# Patient Record
Sex: FEMALE | Race: WHITE | NOT HISPANIC OR LATINO | Employment: FULL TIME | URBAN - METROPOLITAN AREA
[De-identification: names, ages, dates, MRNs, and addresses within clinical notes are randomized per-mention and may not be internally consistent; named-entity substitution may affect disease eponyms.]

---

## 2017-09-06 ENCOUNTER — ALLSCRIPTS OFFICE VISIT (OUTPATIENT)
Dept: OTHER | Facility: OTHER | Age: 42
End: 2017-09-06

## 2017-09-06 DIAGNOSIS — E04.0 NONTOXIC DIFFUSE GOITER: ICD-10-CM

## 2017-09-06 DIAGNOSIS — R00.0 TACHYCARDIA: ICD-10-CM

## 2017-09-06 DIAGNOSIS — Z12.31 ENCOUNTER FOR SCREENING MAMMOGRAM FOR MALIGNANT NEOPLASM OF BREAST: ICD-10-CM

## 2017-09-06 LAB
BILIRUB UR QL STRIP: NEGATIVE
CLARITY UR: NORMAL
COLOR UR: NORMAL
GLUCOSE (HISTORICAL): NEGATIVE
HGB UR QL STRIP.AUTO: NEGATIVE
KETONES UR STRIP-MCNC: NEGATIVE MG/DL
LEUKOCYTE ESTERASE UR QL STRIP: NEGATIVE
NITRITE UR QL STRIP: NEGATIVE
PH UR STRIP.AUTO: 8 [PH]
PROT UR STRIP-MCNC: NORMAL MG/DL
SP GR UR STRIP.AUTO: 1.01
UROBILINOGEN UR QL STRIP.AUTO: NEGATIVE

## 2017-09-07 ENCOUNTER — GENERIC CONVERSION - ENCOUNTER (OUTPATIENT)
Dept: OTHER | Facility: OTHER | Age: 42
End: 2017-09-07

## 2017-09-09 ENCOUNTER — TRANSCRIBE ORDERS (OUTPATIENT)
Dept: ADMINISTRATIVE | Facility: HOSPITAL | Age: 42
End: 2017-09-09

## 2017-09-09 ENCOUNTER — APPOINTMENT (OUTPATIENT)
Dept: LAB | Facility: MEDICAL CENTER | Age: 42
End: 2017-09-09
Payer: COMMERCIAL

## 2017-09-09 DIAGNOSIS — E04.0 GOITER, SPECIFIED AS SIMPLE: ICD-10-CM

## 2017-09-09 DIAGNOSIS — R00.0 TACHYCARDIA, UNSPECIFIED: ICD-10-CM

## 2017-09-09 DIAGNOSIS — E04.0 GOITER, SPECIFIED AS SIMPLE: Primary | ICD-10-CM

## 2017-09-09 LAB
ALBUMIN SERPL BCP-MCNC: 3.9 G/DL (ref 3.5–5)
ALP SERPL-CCNC: 78 U/L (ref 46–116)
ALT SERPL W P-5'-P-CCNC: 26 U/L (ref 12–78)
ANION GAP SERPL CALCULATED.3IONS-SCNC: 7 MMOL/L (ref 4–13)
AST SERPL W P-5'-P-CCNC: 16 U/L (ref 5–45)
BASOPHILS # BLD AUTO: 0.02 THOUSANDS/ΜL (ref 0–0.1)
BASOPHILS NFR BLD AUTO: 0 % (ref 0–1)
BILIRUB SERPL-MCNC: 0.36 MG/DL (ref 0.2–1)
BUN SERPL-MCNC: 12 MG/DL (ref 5–25)
CALCIUM SERPL-MCNC: 9.3 MG/DL (ref 8.3–10.1)
CHLORIDE SERPL-SCNC: 110 MMOL/L (ref 100–108)
CHOLEST SERPL-MCNC: 190 MG/DL (ref 50–200)
CO2 SERPL-SCNC: 23 MMOL/L (ref 21–32)
CREAT SERPL-MCNC: 0.69 MG/DL (ref 0.6–1.3)
EOSINOPHIL # BLD AUTO: 0.07 THOUSAND/ΜL (ref 0–0.61)
EOSINOPHIL NFR BLD AUTO: 1 % (ref 0–6)
ERYTHROCYTE [DISTWIDTH] IN BLOOD BY AUTOMATED COUNT: 12.8 % (ref 11.6–15.1)
GFR SERPL CREATININE-BSD FRML MDRD: 108 ML/MIN/1.73SQ M
GLUCOSE P FAST SERPL-MCNC: 77 MG/DL (ref 65–99)
HCT VFR BLD AUTO: 41.6 % (ref 34.8–46.1)
HDLC SERPL-MCNC: 58 MG/DL (ref 40–60)
HGB BLD-MCNC: 14.3 G/DL (ref 11.5–15.4)
LDLC SERPL CALC-MCNC: 110 MG/DL (ref 0–100)
LYMPHOCYTES # BLD AUTO: 2.11 THOUSANDS/ΜL (ref 0.6–4.47)
LYMPHOCYTES NFR BLD AUTO: 33 % (ref 14–44)
MCH RBC QN AUTO: 31 PG (ref 26.8–34.3)
MCHC RBC AUTO-ENTMCNC: 34.4 G/DL (ref 31.4–37.4)
MCV RBC AUTO: 90 FL (ref 82–98)
MONOCYTES # BLD AUTO: 0.31 THOUSAND/ΜL (ref 0.17–1.22)
MONOCYTES NFR BLD AUTO: 5 % (ref 4–12)
NEUTROPHILS # BLD AUTO: 3.96 THOUSANDS/ΜL (ref 1.85–7.62)
NEUTS SEG NFR BLD AUTO: 61 % (ref 43–75)
NRBC BLD AUTO-RTO: 0 /100 WBCS
PLATELET # BLD AUTO: 269 THOUSANDS/UL (ref 149–390)
PMV BLD AUTO: 10.3 FL (ref 8.9–12.7)
POTASSIUM SERPL-SCNC: 4.3 MMOL/L (ref 3.5–5.3)
PROT SERPL-MCNC: 7.6 G/DL (ref 6.4–8.2)
RBC # BLD AUTO: 4.61 MILLION/UL (ref 3.81–5.12)
SODIUM SERPL-SCNC: 140 MMOL/L (ref 136–145)
T4 FREE SERPL-MCNC: 0.9 NG/DL (ref 0.76–1.46)
TRIGL SERPL-MCNC: 109 MG/DL
TSH SERPL DL<=0.05 MIU/L-ACNC: 0.64 UIU/ML (ref 0.36–3.74)
WBC # BLD AUTO: 6.49 THOUSAND/UL (ref 4.31–10.16)

## 2017-09-09 PROCEDURE — 80053 COMPREHEN METABOLIC PANEL: CPT

## 2017-09-09 PROCEDURE — 36415 COLL VENOUS BLD VENIPUNCTURE: CPT

## 2017-09-09 PROCEDURE — 84439 ASSAY OF FREE THYROXINE: CPT

## 2017-09-09 PROCEDURE — 80061 LIPID PANEL: CPT

## 2017-09-09 PROCEDURE — 84443 ASSAY THYROID STIM HORMONE: CPT

## 2017-09-09 PROCEDURE — 85025 COMPLETE CBC W/AUTO DIFF WBC: CPT

## 2017-09-11 ENCOUNTER — GENERIC CONVERSION - ENCOUNTER (OUTPATIENT)
Dept: OTHER | Facility: OTHER | Age: 42
End: 2017-09-11

## 2017-09-13 ENCOUNTER — HOSPITAL ENCOUNTER (OUTPATIENT)
Dept: NON INVASIVE DIAGNOSTICS | Facility: CLINIC | Age: 42
Discharge: HOME | End: 2017-09-13
Payer: COMMERCIAL

## 2017-09-13 DIAGNOSIS — R00.0 TACHYCARDIA: ICD-10-CM

## 2017-09-13 PROCEDURE — 93226 XTRNL ECG REC<48 HR SCAN A/R: CPT

## 2017-09-13 PROCEDURE — 93225 XTRNL ECG REC<48 HRS REC: CPT

## 2017-09-18 ENCOUNTER — GENERIC CONVERSION - ENCOUNTER (OUTPATIENT)
Dept: OTHER | Facility: OTHER | Age: 42
End: 2017-09-18

## 2017-09-26 ENCOUNTER — HOSPITAL ENCOUNTER (OUTPATIENT)
Dept: RADIOLOGY | Facility: MEDICAL CENTER | Age: 42
Discharge: HOME | End: 2017-09-26
Payer: COMMERCIAL

## 2017-09-26 DIAGNOSIS — E04.0 NONTOXIC DIFFUSE GOITER: ICD-10-CM

## 2017-09-26 DIAGNOSIS — Z12.31 ENCOUNTER FOR SCREENING MAMMOGRAM FOR MALIGNANT NEOPLASM OF BREAST: ICD-10-CM

## 2017-09-26 PROCEDURE — 76536 US EXAM OF HEAD AND NECK: CPT

## 2017-09-26 PROCEDURE — G0202 SCR MAMMO BI INCL CAD: HCPCS

## 2017-09-27 ENCOUNTER — GENERIC CONVERSION - ENCOUNTER (OUTPATIENT)
Dept: OTHER | Facility: OTHER | Age: 42
End: 2017-09-27

## 2017-10-03 ENCOUNTER — GENERIC CONVERSION - ENCOUNTER (OUTPATIENT)
Dept: OTHER | Facility: OTHER | Age: 42
End: 2017-10-03

## 2017-10-09 ENCOUNTER — ALLSCRIPTS OFFICE VISIT (OUTPATIENT)
Dept: OTHER | Facility: OTHER | Age: 42
End: 2017-10-09

## 2017-10-10 NOTE — CONSULTS
Assessment  1  Heart palpitations (785 1) (R00 2)    Plan  Heart palpitations    · Follow-up PRN Evaluation and Treatment  Follow-up  Status: Complete  Done:  [id number]   Ordered; For: Heart palpitations; Ordered By: Nathan Peterson Performed:  Due: [id number]   · ECHO COMPLETE WITH CONTRAST IF INDICATED; Status:Need Information - Financial  Authorization; Requested TIZ:[id number];    Perform:[facility]; QWU:[id number]; Last Updated By:Lianet Davenport; 10/9/2017 2:44:28 PM;Ordered;For:Heart palpitations; Ordered By:Mg Srivastava;    Discussion/Summary    I had the pleasure of seeing Noemi Jackman who presents for further evaluation of palpitations  She is a pleasant 60-year-old lady without a history of hypertension, dyslipidemia or diabetes  She has smoked for 20 years-currently about 1 pack per day  She also had a total abdominal hysterectomy and bilateral salpingo-oophorectomy -2014She has been experiencing palpitations-mostly at night, not associated with activity  Coffee intake is more than usual  No recent over-the-counter sinus medications  Minimal alcohol intake  Evaluation for this included a BMP and thyroid profile both of which were normal  She does have thyroid nodules but is euthyroid  ECG demonstrates normal sinus rhythm  Cardiac physical exam is normal       Palpitations: Check echocardiogram, recent Holter with about 75 PACs and PVCs  Did not have any symptoms at that time  She could repeat a Holter if her symptoms are becoming more frequent  Palpitations might be from stress /heightened sensitivity versus PACs/PVCs  Check echocardiogram  Recent normal TSH and BMP  No further evaluation  Occasional atypical chest pains: 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  Without symptoms  No further evaluation at this time  Smoking cessation was advised  She can follow up as needed     The patient was counseled regarding diagnostic results,-instructions for [facility] factor throat problems, or snoring  Gastrointestinal: No complaints of liver problems, nausea, vomiting, heartburn, constipation, bloody stools, diarrhea, problems swallowing, adbominal pain, or rectal bleeding  Hematologic: No complaints of bleeding disorders, anemia, blood clots, or excessive brusing  Neurological: No complaints of numbness, tingling, dizziness, weakness, seizures, headaches, syncope or fainting, AM fatigue, daytime sleepiness, no witnessed apnea episodes  Musculoskeletal: No complaints of arthritis, back pain, or painfull swelling  ROS reviewed  Active Problems  1  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)   2  Dysplastic Nevus (238 2)   3  Onychomycosis of toenail (110 1) (B35 1)   4  Routine Gynecological Exam With Cervical Pap Smear (V72 31)   5  Tachycardia (785 0) (R00 0)   6  Visit for screening mammogram (V76 12) (Z12 31)    Past Medical History   · History of Chest pain on respiration (786 52) (R07 1)   · History of Costochondritis (733 6) (M94 0)   · History of H/O: hysterectomy (V88 01) (Z90 710)   · History of thyroid disease (V12 29) (Z86 39)   · History of Joint pain (719 40) (M25 50)   · History of  (spontaneous vaginal delivery) (650) (O80)    The active problems and past medical history were reviewed and updated today  Surgical History   · History of Tubal Ligation   · History of Vaginal Hysterectomy    The surgical history was reviewed and updated today  Family History  Mother    · Family history of Ovarian Cancer (V16 41)  Father    · Family history of Family Estrangement  Brother    · Family history of Family Estrangement Between Siblings  Maternal Grandfather    · Family history of Malignant Pancreatic Neoplasm  Maternal Aunt    · Family history of Thyroid Cancer  Maternal Uncle    · Family history of Liver Cancer   · Family history of Malignant Pancreatic Neoplasm  Family History Reviewed: The family history was reviewed and updated today         Social History   · Alcohol use (V49 89) (Z78 9)   · Being A Social Drinker   · Coffee   · Current Every Day Smoker (305 1)   · Daily Coffee Consumption (___ Cups/Day)   · Exercise frequency (times/week)   ·    · Sexually active   · Three children   · Uses Safety Equipment - Seatbelts  The social history was reviewed and updated today  The social history was reviewed and is unchanged  Current Meds   1  No Reported Medications Recorded    The medication list was reviewed and updated today  Allergies  1  Penicillins    Vitals  Signs   Heart Rate: 88  Systolic: 276, LUE, Sitting  Diastolic: 60, LUE, Sitting  Height: 5 ft 6 5 in  Weight: 169 lb   BMI Calculated: 26 87  BSA Calculated: 1 87    Physical Exam    Constitutional   General appearance: No acute distress, well appearing and well nourished  Eyes   Conjunctiva and Sclera examination: Conjunctiva pink, sclera anicteric  Ears, Nose, Mouth, and Throat - Oropharynx: Clear, nares are clear, mucous membranes are moist    Neck   Neck and thyroid: Normal, supple, trachea midline, no thyromegaly  Pulmonary   Respiratory effort: No increased work of breathing or signs of respiratory distress  Auscultation of lungs: Clear to auscultation, no rales, no rhonchi, no wheezing, good air movement  Cardiovascular   Auscultation of heart: Normal rate and rhythm, normal S1 and S2, no murmurs  Carotid pulses: Normal, 2+ bilaterally  Peripheral vascular exam: Normal pulses throughout, no tenderness, erythema or swelling  Pedal pulses: Normal, 2+ bilaterally  Examination of extremities for edema and/or varicosities: Normal     Abdomen   Abdomen: Non-tender and no distention  Liver and spleen: No hepatomegaly or splenomegaly  Musculoskeletal Gait and station: Normal gait  -Digits and nails: Normal without clubbing or cyanosis -Inspection/palpation of joints, bones, and muscles: Normal, ROM normal     Skin - Skin and subcutaneous tissue: Normal without rashes or lesions  Skin is warm and well perfused, normal turgor  Neurologic - Cranial nerves: II - XII intact -Speech: Normal     Psychiatric - Orientation to person, place, and time: Normal -Mood and affect: Normal       Results/Data  A 12 lead ECG was performed and was normal    Rhythm and rate:  normal sinus rhythm  End of Encounter Meds  1  No Reported Medications Recorded    Signatures   Electronically signed by :  WISAM Goss ; Oct  9 2017  2:55PM EST                       (Author)

## 2018-01-09 NOTE — RESULT NOTES
Verified Results  * MAMMO SCREENING BILATERAL W CAD [id number] 09:53AM Tracy Ward Order Number: [id number]    - Patient Instructions: To schedule this appointment, please contact Central Scheduling at [phone]  Do not wear any perfume, powder, lotion or deodorant on breast or underarm area  Please bring your doctors order, referral (if needed) and insurance information with you on the day of the test  Failure to bring this information may result in this test being rescheduled  Arrive 15 minutes prior to your appointment time to register  On the day of your test, please bring any prior mammogram or breast studies with you that were not performed at a [facility]  Failure to bring prior exams may result in your test needing to be rescheduled  Test Name Result Flag Reference   MAMMO SCREENING BILATERAL W CAD (Report)     Patient History:   Patient is postmenopausal    Family history of ovarian cancer under age 48 in mother,    pancreatic cancer under age 48 in maternal uncle, pancreatic    cancer under age 48 in maternal uncle, pancreatic cancer at age    48 or over in maternal grandfather  No Hormone Replacement Therapy   Patient is an every day smoker, and has smoked for 25 years  Patient's BMI is 26 5  Reason for exam: screening, asymptomatic  Mammo Screening Bilateral W CAD: September 26, 2017 - Check In #:   [de-identified]   Bilateral CC and MLO view(s) were taken  Technologist: RT Suzanne RM   No prior studies available for comparison  The breast tissue is heterogeneously dense, potentially limiting    the sensitivity of mammography  Therefore, automated breast    ultrasound or MRI may be beneficial  Patient risk, included in    this report, assists in determining the appropriate adjunct    screening exam  3-D mammography may also be indicated as next    year's screening mammogram (based again on the above factors)     No dominant soft tissue mass or suspicious calcifications are    noted  The skin and nipple contours are within normal limits  No mammographic evidence of malignancy  ACR BI-RADSï¾® Assessments: BiRad:1 - Negative     Recommendation:   Routine screening mammogram of both breasts in 1 year  Analyzed by CAD     The patient is scheduled in a reminder system for screening    mammography  8-10% of cancers will be missed on mammography  Management of a    palpable abnormality must be based on clinical grounds  Patients   will be notified of their results via letter from our facility  Accredited by [employer] of Radiology and FDA  Transcription Location: [facility] 98: [id number]     Risk Value(s):   Tyrer-Cuzick 10 Year: 1 000%, Tyrer-Cuzick Lifetime: 7 000%,    Myriad Table: 3 0%, DEISY 5 Year: 0 5%, NCI Lifetime: 7 2%, MRS    : Based on personal and/or family history,    consideration of hereditary risk assessment may be warranted     Signed by:   Rick Caldwell MD   9/27/17

## 2018-01-09 NOTE — MISCELLANEOUS
Message   Recorded as Task   Date: 10/02/2017 08:29 AM, Created By: Nichol Perez   Task Name: Call Back   Assigned To: Nichol Perez   Regarding Patient: Dariana Overton, Status: Active   Hamida Vargas - 02 Oct 2017 8:29 AM     TASK CREATED  call  pt     thyroids  u/s  shows  nodule/goiter  repeat  thyroid  u/s and   tahyroid  labs  in     one  year  spoke with pt repeat studies in one year      Plan  Diffuse nontoxic goiter    · (1) TSH; Status:Active;  Requested DAVIS:[id number];    · US THYROID; Status:Hold For - Scheduling; Requested Bruce Soriano;     Signatures   Electronically signed by : Arabella Serrano, [facility]; Oct  3 2017  2:59PM EST                       (Author)

## 2018-01-10 NOTE — MISCELLANEOUS
Message  spoke with pt  her  was started on buproprion  she would like the same  they would like to quit smoking together  side efects and use discussed with pt  may use nicotiene replacemtn  pt has triet chantix in the past with no relief        Signatures   Electronically signed by : Cyndi Gonzales, [facility]; Sep  7 2017  4:52PM EST                       (Author)

## 2018-01-12 NOTE — RESULT NOTES
Verified Results  (1) CBC/PLT/DIFF [id number] 10:27AM Jennie Nvest     Test Name Result Flag Reference   WBC COUNT 6 49 Thousand/uL  4 31-10 16   RBC COUNT 4 61 Million/uL  3 81-5 12   HEMOGLOBIN 14 3 g/dL  11 5-15 4   HEMATOCRIT 41 6 %  34 8-46  1   MCV 90 fL  82-98   MCH 31 0 pg  26 8-34 3   MCHC 34 4 g/dL  31 4-37 4   RDW 12 8 %  11 6-15 1   MPV 10 3 fL  8 9-12 7   PLATELET COUNT 096 Thousands/uL  149-390   nRBC AUTOMATED 0 /100 WBCs     NEUTROPHILS RELATIVE PERCENT 61 %  43-75   LYMPHOCYTES RELATIVE PERCENT 33 %  14-44   MONOCYTES RELATIVE PERCENT 5 %  4-12   EOSINOPHILS RELATIVE PERCENT 1 %  0-6   BASOPHILS RELATIVE PERCENT 0 %  0-1   NEUTROPHILS ABSOLUTE COUNT 3 96 Thousands/? ??L  1 85-7 62   LYMPHOCYTES ABSOLUTE COUNT 2 11 Thousands/? ??L  0 60-4 47   MONOCYTES ABSOLUTE COUNT 0 31 Thousand/? ??L  0 17-1 22   EOSINOPHILS ABSOLUTE COUNT 0 07 Thousand/? ??L  0 00-0 61   BASOPHILS ABSOLUTE COUNT 0 02 Thousands/? ??L  0 00-0 10   This is a patient instruction: This test is non-fasting  Please drink two glasses of water morning of bloodwork  (1) T4, FREE [id number] 10:27AM Jennie Nvest     Test Name Result Flag Reference   T4,FREE 0 90 ng/dL  0 76-1 46   Specimen collection should occur prior to Sulfasalazine administration due to the potential for falsely elevated results       (1) COMPREHENSIVE METABOLIC PANEL [id number] 95:93VV [employer]     Test Name Result Flag Reference   SODIUM 140 mmol/L  136-145   POTASSIUM 4 3 mmol/L  3 5-5 3   CHLORIDE 110 mmol/L H 100-108   CARBON DIOXIDE 23 mmol/L  21-32   ANION GAP (CALC) 7 mmol/L  4-13   BLOOD UREA NITROGEN 12 mg/dL  5-25   CREATININE 0 69 mg/dL  0 60-1 30   Standardized to IDMS reference method   CALCIUM 9 3 mg/dL  8 3-10 1   BILI, TOTAL 0 36 mg/dL  0 20-1 00   ALK PHOSPHATAS 78 U/L     ALT (SGPT) 26 U/L  12-78   Specimen collection should occur prior to Sulfasalazine and/or Sulfapyridine administration due to the potential for falsely depressed results  AST(SGOT) 16 U/L  5-45   Specimen collection should occur prior to Sulfasalazine administration due to the potential for falsely depressed results  ALBUMIN 3 9 g/dL  3 5-5 0   TOTAL PROTEIN 7 6 g/dL  6 4-8 2   eGFR 108 ml/min/1 73sq m     [employer] Education Program recommendations are as follows:  GFR calculation is accurate only with a steady state creatinine  Chronic Kidney disease less than 60 ml/min/1 73 sq  meters  Kidney failure less than 15 ml/min/1 73 sq  meters  GLUCOSE FASTING 77 mg/dL  65-99   Specimen collection should occur prior to Sulfasalazine administration due to the potential for falsely depressed results  Specimen collection should occur prior to Sulfapyridine administration due to the potential for falsely elevated results  (1) TSH [id number] 10:27AM [employer]     Test Name Result Flag Reference   TSH 0 642 uIU/mL  0 358-3 740   This is a patient instruction: This test is non-fasting  Please drink two glasses of water morning of bloodwork  Patients undergoing fluorescein dye angiography may retain small amounts of fluorescein in the body for 48-72 hours post procedure  Samples containing fluorescein can produce falsely depressed TSH values  If the patient had this procedure,a specimen should be resubmitted post fluorescein clearance  The recommended reference ranges for TSH during pregnancy are as follows:  First trimester 0 1 to 2 5 uIU/mL  Second trimester  0 2 to 3 0 uIU/mL  Third trimester 0 3 to 3 0 uIU/m     (1) LIPID PANEL, FASTING [id number] 10:27AM Taqueria[employer] Srini     Test Name Result Flag Reference   CHOLESTEROL 190 mg/dL     HDL,DIRECT 58 mg/dL  40-60   Specimen collection should occur prior to Metamizole administration due to the potential for falsley depressed results  LDL CHOLESTEROL CALCULATED 110 mg/dL H 0-100   This is a patient instruction:  This is a fasting test  Water, black tea or black coffee only after 9:00pm the night before the test  Drink 2 glasses of water the morning of the test         Triglyceride:        Normal ??? ??? ??? ??? ??? ??? ??? <150 mg/dl   ??? ??? ???Borderline High ??? ??? 150-199 mg/dl   ??? ??? ? ?? High ??? ??? ??? ??? ??? ??? ??? 200-499 mg/dl   ??? ??? ? ??Very High ??? ??? ??? ??? ??? >499 mg/dl      Cholesterol:       Desirable ??? ??? ??? ??? <200 mg/dl   ??? ??? Borderline High ??? 200-239 mg/dl   ??? ??? High ??? ??? ??? ??? ??? ??? >239 mg/dl      HDL Cholesterol:       High ??? ???>59 mg/dL   ??? ??? Low ??? ??? <41 mg/dL      This screening LDL is a calculated result  It does not have the accuracy of the Direct Measured LDL in the monitoring of patients with hyperlipidemia and/or statin therapy  Direct Measure LDL ([id number]) must be ordered separately in these patients  TRIGLYCERIDES 109 mg/dL  <=150   Specimen collection should occur prior to N-Acetylcysteine or Metamizole administration due to the potential for falsely depressed results

## 2018-01-13 VITALS
HEIGHT: 67 IN | SYSTOLIC BLOOD PRESSURE: 120 MMHG | HEART RATE: 88 BPM | BODY MASS INDEX: 26.53 KG/M2 | WEIGHT: 169 LBS | DIASTOLIC BLOOD PRESSURE: 60 MMHG

## 2018-01-13 VITALS
OXYGEN SATURATION: 99 % | TEMPERATURE: 97 F | RESPIRATION RATE: 18 BRPM | HEART RATE: 79 BPM | SYSTOLIC BLOOD PRESSURE: 122 MMHG | BODY MASS INDEX: 26.56 KG/M2 | WEIGHT: 169.2 LBS | DIASTOLIC BLOOD PRESSURE: 82 MMHG | HEIGHT: 67 IN

## 2018-01-15 NOTE — PROGRESS NOTES
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)   3  Tachycardia (785 0) (R00 0)    Plan   Diffuse nontoxic goiter    · US THYROID; Status:Hold For - Scheduling; Requested XMM:[id number];   Diffuse nontoxic goiter, Tachycardia    · (1) CBC/PLT/DIFF; Status:Active; Requested SDK:[id number];    · (1) COMPREHENSIVE METABOLIC PANEL; Status:Active; Requested TFP:[id number];    · (1) LIPID PANEL, FASTING; Status:Active; Requested JOT:[id number];    · (1) T4, FREE; Status:Active; Requested DMC:[id number];    · (1) TSH; Status:Active; Requested TVX:[id number]; Tachycardia    · EKG/ECG- POC; Status:Active - Perform Order; Requested NQJ:[id number];    · Begin or continue regular aerobic exercise  Gradually work up to at least {count1}  sessions of {dur1} of exercise a week ; Status:Complete;   Done: [id number]   · You need to quit smoking ; Status:Complete;   Done: 49NCM2499  Visit for screening mammogram    · * MAMMO SCREENING BILATERAL W CAD; Status:Active; Requested XOX:[id number];     HOLTER MONITOR - 24 HOUR; Status:Hold For - Scheduling; Requested MZO:[id number];   Perform:[facility]; RNU:[id number]; Ordered; For:Tachycardia; Ordered By:Ish Rosa; Discussion/Summary  health maintenance visit Currently, she has an inadequate exercise regimen  cervical cancer screening is current Breast cancer screening: mammogram has been ordered  Colorectal cancer screening: colorectal cancer screening is not indicated  Osteoporosis screening: bone mineral density testing is not indicated  Screening lab work includes hemoglobin, glucose, lipid profile, thyroid function testing and urinalysis  The patient declines immunizations  Advice and education were given regarding tobacco cessation  The treatment plan was reviewed with the patient/guardian   The patient/guardian understands and agrees with the treatment plan      Chief Complaint  physical History of Present Illness  HM, Adult Female: The patient is being seen for a health maintenance evaluation  General Health: The patient's health since the last visit is described as good  She has regular dental visits  She denies vision problems  She denies hearing loss  Lifestyle:  She does not have a healthy diet  She does not exercise regularly  She uses tobacco  She denies alcohol use  She denies drug use  Screening: Cervical cancer screening includes a pap smear performed 2014  Breast cancer screening includes no previous mammogram  She hasn't been previously screened for colorectal cancer  Metabolic screening includes lipid profile performed within the past five years, glucose screening performed 2014, thyroid function test performed 2014 and no previous DEXA  HPI: pt presetsn for physical rouutine  she needs ammogram  fluy vaccine declined  pt needs labs and thyroid u/s for her goiter  pt has noticed her heart ratae is fast at night  pt states it lasts 10-15 minutes  no cp  Review of Systems    Constitutional: no recent weight gain and no recent weight loss  Eyes: no eyesight problems  Cardiovascular: fast heart rate, but no chest pain and no lower extremity edema  Respiratory: no shortness of breath, no cough and no wheezing  Gastrointestinal: no abdominal pain, no constipation, no diarrhea and no blood in stools  Genitourinary: no dysuria and no pelvic pain  Musculoskeletal: no arthralgias and no myalgias  Integumentary: no rashes  Neurological: dizziness, but no headache  Psychiatric: no sleep disturbances  Active Problems    1  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)   2  Dysplastic Nevus (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)   3  Onychomycosis of toenail (110 1) (B35 1)   4  Routine Gynecological Exam With Cervical Pap Smear (V72 31)   5   Visit for screening mammogram (V76 12) (Z12 31)    Past Medical History    · History of Chest pain on respiration (786 52) (R07 1)   · History of Costochondritis (733 6) (M94 0)   · History of H/O: hysterectomy (V88 01) (Z90 710)   · History of thyroid disease (V12 29) (Z86 39)   · History of Joint pain (719 40) (M25 50)   · History of  (spontaneous vaginal delivery) (650) (O80)    Surgical History    · History of Tubal Ligation   · History of Vaginal Hysterectomy    Family History  Mother    · Family history of Ovarian Cancer (V16 41)  Father    · Family history of Family Estrangement  Brother    · Family history of Family Estrangement Between Siblings  Maternal Grandfather    · Family history of Malignant Pancreatic Neoplasm  Maternal Aunt    · Family history of Thyroid Cancer  Maternal Uncle    · Family history of Liver Cancer   · Family history of Malignant Pancreatic Neoplasm    Social History    · Alcohol use (V49 89) (Z78 9)   · Being A Social Drinker   · Coffee   · Current Every Day Smoker (305 1)   · Daily Coffee Consumption (___ Cups/Day)   · Exercise frequency (times/week)   ·    · Sexually active   · Three children   · Uses Safety Equipment - Seatbelts    Current Meds   1  No Reported Medications Recorded    Allergies    1  Penicillins    Vitals   Recorded: 89YFP2140 05:52PM   Temperature 97 F, Tympanic   Heart Rate 79, L Brachial Artery   Pulse Quality Normal, L Brachial Artery   Respiration Quality Normal   Respiration 18   Systolic 010, LUE, Sitting   Diastolic 82, LUE, Sitting   Height 5 ft 6 5 in   Weight 169 lb 3 2 oz   BMI Calculated 26 9   BSA Calculated 1 87   O2 Saturation 99     Physical Exam    Constitutional   General appearance: No acute distress, well appearing and well nourished  Head and Face   Head and face: Normal     Eyes   Conjunctiva and lids: No swelling, erythema or discharge  Pupils and irises: Equal, round, reactive to light  Ears, Nose, Mouth, and Throat   External inspection of ears and nose: Normal     Otoscopic examination: Tympanic membranes translucent with normal light reflex   Canals patent without erythema  Lips, teeth, and gums: Normal, good dentition  Oropharynx: Normal with no erythema, edema, exudate or lesions  Neck   Neck: Supple, symmetric, trachea midline, no masses  Thyroid: Abnormal   goiter right side grater than left  no masses palpated  Pulmonary   Respiratory effort: No increased work of breathing or signs of respiratory distress  Auscultation of lungs: Clear to auscultation  Cardiovascular   Auscultation of heart: Normal rate and rhythm, normal S1 and S2, no murmurs  The heart rate was normal  The rhythm was regular  no murmurs were heard  Carotid pulses: 2+ bilaterally  Examination of extremities for edema and/or varicosities: Normal     Abdomen   Abdomen: Non-tender, no masses  Liver and spleen: No hepatomegaly or splenomegaly  Lymphatic   Palpation of lymph nodes in neck: No lymphadenopathy  Musculoskeletal   Gait and station: Normal     Digits and nails: Normal without clubbing or cyanosis  Examination of the extremities revealed no fingernail clubbing and well perfused fingers  Muscle strength/tone: Normal     Neurologic   Cranial nerves: Cranial nerves II-XII intact  Cranial Nerve II: visual acuity and visual fields were intact  Cranial Nerves III, IV, and VI: the extraocular motions were intact  Cranial Nerve V: sensation to the face and masseter strength were intact  Cranial Nerve VII: facial strength was intact bilaterally  Cranial Nerve XII: there was no tongue deviation with protrusion  Reflexes: 2+ and symmetric  Deep tendon reflexes: 2+ right brachioradialis, 2+ left brachioradialis, 2+ right patella and 2+ left patella     Psychiatric   Judgment and insight: Normal     Mood and affect: Normal        Results/Data  Urine Dip Non-Automated- POC [id number] 05:57PM Davian Mayberry     Test Name Result Flag Reference   Color Aminata     Clarity Transparent     Leukocytes negative     Nitrite negative     Blood negative     Bilirubin negative     Urobilinogen negative     Protein trace     Ph 8     Specific Gravity 1 010     Ketone negative     Glucose negative         Procedure    Procedure: Hearing Acuity Test    Indication: Routine screeing  Audiometry: Normal bilaterally  Hearing in the right ear: 20 decibals at 500 hertz, 25 decibals at 1000 hertz, 20 decibals at 2000 hertz and 20 decibals at 4000 hertz  Hearing in the left ear: 20 decibals at 500 hertz, 25 decibals at 1000 hertz, 20 decibals at 2000 hertz and 25 decibals at 4000 hertz  The patient Tolerated the procedure well  There were no complications  Procedure: Visual Acuity Test    Indication: routine screening  Results: 20/20 in both eyes without corrective device, 20/25 in the right eye without corrective device, 20/20 in the left eye without corrective device normal in both eyes  Color vision was and the results were normal    The patient tolerated the procedure well  There were no complications  Health Management  Health Maintenance   BREAST EXAM; every 1 year; Next Due: [id number];  Overdue    Verified Results  Urine Dip Non-Automated- POC [id number] 05:57PM Danyel Mix     Test Name Result Flag Reference   Color Aminata     Clarity Transparent     Leukocytes negative     Nitrite negative     Blood negative     Bilirubin negative     Urobilinogen negative     Protein trace     Ph 8     Specific Gravity 1 010     Ketone negative     Glucose negative         Signatures   Electronically signed by : Marvel [facility]; Sep  6 2017  6:23PM EST                       (Author)    Electronically signed by : WISAM Rivera ; Sep  7 2017  9:45AM EST                       (Author)

## 2018-01-17 NOTE — MISCELLANEOUS
Message   Recorded as Task   Date: 09/15/2017 08:09 AM, Created By: Janet Salgado   Task Name: Call Back   Assigned To: Janet Salgado   Regarding Patient: Bradley Godfrey, Status: Active   CommentAnitra Backbone - 15 Sep 2017 8:09 AM     TASK CREATED  call   pt wiht  haltor  monitor  results  tachyardia  refer  to  cardiology   spoke iwht pt  halter monitor results shows tachycardia  refer to cardiology   pt agrees      Plan  Tachycardia    · *1 - [facility] (CARDIOLOGY ) Co-Management  *  Status: Active   Requested for: 91GMK1247  Care Summary provided  : Yes    Signatures   Electronically signed by : Nick Chand, Katelynn Novak; Sep 18 2017 12:53PM EST                       (Author)

## 2018-08-03 ENCOUNTER — OFFICE VISIT (OUTPATIENT)
Dept: URGENT CARE | Facility: MEDICAL CENTER | Age: 43
End: 2018-08-03
Payer: COMMERCIAL

## 2018-08-03 VITALS
SYSTOLIC BLOOD PRESSURE: 126 MMHG | OXYGEN SATURATION: 99 % | HEART RATE: 79 BPM | RESPIRATION RATE: 12 BRPM | DIASTOLIC BLOOD PRESSURE: 82 MMHG | TEMPERATURE: 98.3 F | HEIGHT: 68 IN | WEIGHT: 176.8 LBS | BODY MASS INDEX: 26.8 KG/M2

## 2018-08-03 DIAGNOSIS — S92.502A FRACTURE OF THIRD TOE, LEFT, CLOSED, INITIAL ENCOUNTER: Primary | ICD-10-CM

## 2018-08-03 PROCEDURE — G0382 LEV 3 HOSP TYPE B ED VISIT: HCPCS | Performed by: PHYSICIAN ASSISTANT

## 2018-08-03 NOTE — PATIENT INSTRUCTIONS
Fracture of the 3rd toe proximal phalanx noted on xray, will call if radiology report differs  Ice, elevation, and rest  Ibuprofen and/or tylenol as needed for pain  Patient refused buddy taping in office- states she will do it herself at home  Keep buddy toe taped for immobilization  Follow up with your PCP for persistent symptoms  Go to the ER for any distress  Toe Fracture   AMBULATORY CARE:   A toe fracture  is a break in 1 or more of the bones in your toe  It is most commonly caused by a direct blow to the toe  The bones in your toe may just be broken, or they may be out of place or   Common symptoms include the following:   · Pain, redness, and swelling    · Inability to bend or move your toe    · Inability to walk or put weight on your toe    · Toe is bent at an abnormal angle  Seek care immediately if:   · Blood soaks through your bandage  · You have severe pain in your toe  · Your toe is cold or numb  Contact your healthcare provider if:   · You have a fever  · Your pain does not go away, even after treatment  · Your toe continues to hurt even after it has healed  · You have questions or concerns about your condition or care  Treatment for a toe fracture  may include any of the following:  · NSAIDs , such as ibuprofen, help decrease swelling, pain, and fever  This medicine is available with or without a doctor's order  NSAIDs can cause stomach bleeding or kidney problems in certain people  If you take blood thinner medicine, always ask your healthcare provider if NSAIDs are safe for you  Always read the medicine label and follow directions  · Prescription pain medicine  may be given  Ask your healthcare provider how to take this medicine safely  Some prescription pain medicines contain acetaminophen  Do not take other medicines that contain acetaminophen without talking to your healthcare provider  Too much acetaminophen may cause liver damage   Prescription pain medicine may cause constipation  Ask your healthcare provider how to prevent or treat constipation  · Antibiotics  treat a bacterial infection  You may need antibiotics if you have an open wound  · Take your medicine as directed  Contact your healthcare provider if you think your medicine is not helping or if you have side effects  Tell him of her if you are allergic to any medicine  Keep a list of the medicines, vitamins, and herbs you take  Include the amounts, and when and why you take them  Bring the list or the pill bottles to follow-up visits  Carry your medicine list with you in case of an emergency  Self-care:   · Use marlin tape, an elastic bandage, or a splint as directed  These help keep your toe in its correct position as it heals  Marlin tape is when your fractured toe and the toe next to it are taped together  · Use support devices  including a cane, crutches, walking boot, or hard soled shoe as directed  These help protect your broken toe and limit movement so it can heal     · Rest  your toe so that it can heal  Return to normal activities as directed  · Apply ice  on your toe for 15 to 20 minutes every hour or as directed  Use an ice pack, or put crushed ice in a plastic bag  Cover it with a towel  Ice helps prevent tissue damage and decreases swelling and pain  · Elevate  your toe above the level of your heart as often as you can  This will help decrease swelling and pain  Prop your toe on pillows or blankets to keep it elevated comfortably  Follow up with your healthcare provider as directed: You may need to return in 2 to 4 weeks  Write down your questions so you remember to ask them during your visits  © 2017 [employer] Information is for End User's use only and may not be sold, redistributed or otherwise used for commercial purposes   All illustrations and images included in CareNotes® are the copyrighted property of [employer]  or [employer]  The above information is an  only  It is not intended as medical advice for individual conditions or treatments  Talk to your doctor, nurse or pharmacist before following any medical regimen to see if it is safe and effective for you

## 2018-08-03 NOTE — PROGRESS NOTES
[employer] [facility]        NAME: Alfredo Valdez is a 37 y o  female  : 1975    MRN: [id number]  DATE: August 3, 2018  TIME: 2:04 PM    Assessment and Plan   Fracture of third toe, left, closed, initial encounter [S92 502A]  1  Fracture of third toe, left, closed, initial encounter  CANCELED: XR foot 2 vw left         Patient Instructions     Fracture of the 3rd toe proximal phalanx noted on xray, will call if radiology report differs  Ice, elevation, and rest  Ibuprofen and/or tylenol as needed for pain  Patient refused buddy taping in office- states she will do it herself at home  Keep buddy toe taped for immobilization  Follow up with PCP in 3-5 days  Proceed to  ER if symptoms worsen  Chief Complaint     Chief Complaint   Patient presents with    Toe Injury     left foot 3rd toe- jammed her foot into the stair and has been dealing with swelling and pain x 2 days         History of Present Illness       This is a 37year old female presenting for left 3rd toe pain x 2 days  She jammed her toe while walking up the stairs 2 days ago and has been having pain ever since  She notes some mild edema to the toe and foot  She has pain with extension of the toe but states that she is walking okay  No treatments for this yet  Review of Systems   Review of Systems   Constitutional: Negative for chills, fatigue and fever  Gastrointestinal: Negative for nausea and vomiting  Musculoskeletal: Positive for arthralgias and joint swelling  Skin: Negative for wound  Neurological: Negative for weakness and numbness  Current Medications     No current outpatient prescriptions on file      Current Allergies     Allergies as of 2018 - Reviewed 2018   Allergen Reaction Noted    Penicillins  2014            The following portions of the patient's history were reviewed and updated as appropriate: allergies, current medications, past family history, past medical history, past social history, past surgical history and problem list      Past Medical History:   Diagnosis Date    Disease of thyroid gland        Past Surgical History:   Procedure Laterality Date    HYSTERECTOMY      TOTAL ABDOMINAL HYSTERECTOMY W/ BILATERAL SALPINGOOPHORECTOMY         No family history on file  Medications have been verified  Objective   /82   Pulse 79   Temp 98 3 °F (36 8 °C) (Temporal)   Resp 12   Ht 5' 8" (1 727 m)   Wt 80 2 kg (176 lb 12 8 oz)   SpO2 99%   BMI 26 88 kg/m²        Physical Exam     Physical Exam   Constitutional: She appears well-developed and well-nourished  No distress  HENT:   Head: Normocephalic and atraumatic  Nose: Nose normal    Eyes: Conjunctivae are normal  Pupils are equal, round, and reactive to light  Cardiovascular: Normal rate, regular rhythm and normal heart sounds  Pulmonary/Chest: Effort normal and breath sounds normal  No respiratory distress  She has no wheezes  She has no rales  Musculoskeletal:   Left foot: There is mild edema, erythema to the 3rd toe and extending into the 3rd metacarpal  TTP at the 3rd MTP joint  Sensation intact, distal cap refill less than 2 seconds, 2+ dorsalis pedis and posterior tibialis pulses  Neurological: She is alert  Skin: Skin is warm and dry  She is not diaphoretic  Nursing note and vitals reviewed

## 2018-09-01 DIAGNOSIS — E04.0 NONTOXIC DIFFUSE GOITER: ICD-10-CM

## 2019-01-16 ENCOUNTER — OFFICE VISIT (OUTPATIENT)
Dept: FAMILY MEDICINE CLINIC | Facility: CLINIC | Age: 44
End: 2019-01-16
Payer: COMMERCIAL

## 2019-01-16 VITALS
WEIGHT: 179.4 LBS | RESPIRATION RATE: 16 BRPM | DIASTOLIC BLOOD PRESSURE: 70 MMHG | HEIGHT: 68 IN | HEART RATE: 82 BPM | SYSTOLIC BLOOD PRESSURE: 110 MMHG | OXYGEN SATURATION: 98 % | TEMPERATURE: 98.5 F | BODY MASS INDEX: 27.19 KG/M2

## 2019-01-16 DIAGNOSIS — M19.041 ARTHRITIS OF BOTH HANDS: ICD-10-CM

## 2019-01-16 DIAGNOSIS — M19.042 ARTHRITIS OF BOTH HANDS: ICD-10-CM

## 2019-01-16 DIAGNOSIS — R22.31 AXILLARY LUMP, RIGHT: ICD-10-CM

## 2019-01-16 DIAGNOSIS — Z00.00 ROUTINE ADULT HEALTH MAINTENANCE: Primary | ICD-10-CM

## 2019-01-16 DIAGNOSIS — F17.200 TOBACCO DEPENDENCE: ICD-10-CM

## 2019-01-16 DIAGNOSIS — Z12.31 ENCOUNTER FOR SCREENING MAMMOGRAM FOR BREAST CANCER: ICD-10-CM

## 2019-01-16 PROCEDURE — 99214 OFFICE O/P EST MOD 30 MIN: CPT | Performed by: FAMILY MEDICINE

## 2019-01-16 PROCEDURE — 99396 PREV VISIT EST AGE 40-64: CPT | Performed by: FAMILY MEDICINE

## 2019-01-16 RX ORDER — VARENICLINE TARTRATE 25 MG
KIT ORAL
Qty: 53 TABLET | Refills: 0 | Status: SHIPPED | OUTPATIENT
Start: 2019-01-16 | End: 2019-02-07

## 2019-01-16 NOTE — PROGRESS NOTES
Assessment/Plan:         Diagnoses and all orders for this visit:    Routine adult health maintenance  -     CBC and differential; Future  -     Comprehensive metabolic panel; Future  -     DINA Screen w/ Reflex to Titer/Pattern; Future  -     Lyme Antibody Profile with reflex to WB; Future  -     RF Screen w/ Reflex to Titer; Future  -     Sedimentation rate, automated; Future  -     TSH, 3rd generation; Future  -     Lipid panel; Future  -     Mammo screening bilateral w cad; Future    Tobacco dependence  -     varenicline (CHANTIX MACIE) 0 5 MG X 11 & 1 MG X 42 tablet; Take one 0 5mg tab by mouth 1x daily for 3 days, then increase to one 0 5mg tab 2x daily for 3 days, then increase to one 1mg tab 2x daily    Arthritis of both hands  -     CBC and differential; Future  -     Comprehensive metabolic panel; Future  -     DINA Screen w/ Reflex to Titer/Pattern; Future  -     Lyme Antibody Profile with reflex to WB; Future  -     RF Screen w/ Reflex to Titer; Future  -     Sedimentation rate, automated; Future  -     TSH, 3rd generation; Future  -     Lipid panel; Future  -     Mammo screening bilateral w cad; Future    Encounter for screening mammogram for breast cancer    Axillary lump, right  -     US extremity soft tissue; Future          Subjective:      Patient ID: Carmen Pardo is a 37 y o  female  Here for physical, wants to quit smoking, would like to try chantix which did make her zamora, but took away her desire to smoke  But had been going through a lot of issues at the time 10 yrs ago  C/o lumps in R axilla x 3weeks, and upper back for a while  Not trying any warm compresses  C/o joint pain, rosie hands and wrist, has custody of 3 grandkids, stays home with them, has trouble buttoning their coats for example  prior to that had been working in a [employer]  occ N/T, weakness, does need help opening jars  Also c/o dryness on the R loan, dry cracking over the fingertips as well   Has had issues with latex gloves in the past          The following portions of the patient's history were reviewed and updated as appropriate: allergies, current medications, past family history, past medical history, past social history, past surgical history and problem list     Review of Systems   Constitutional: Negative  HENT: Negative  Respiratory: Negative  Cardiovascular: Negative  Gastrointestinal: Negative  Genitourinary: Negative  Musculoskeletal: Positive for arthralgias (hands, fingers)  Neurological: Negative  Hematological: Negative  Psychiatric/Behavioral: Negative  Objective:      /70 (BP Location: Right arm, Patient Position: Sitting, Cuff Size: Standard)   Pulse 82   Temp 98 5 °F (36 9 °C)   Resp 16   Ht 5' 8" (1 727 m)   Wt 81 4 kg (179 lb 6 4 oz)   SpO2 98%   BMI 27 28 kg/m²          Physical Exam   Constitutional: She is oriented to person, place, and time  She appears well-developed and well-nourished  HENT:   Right Ear: External ear normal    Left Ear: External ear normal    Mouth/Throat: Oropharynx is clear and moist    Eyes: Pupils are equal, round, and reactive to light  Conjunctivae and EOM are normal    Neck: Normal range of motion  Neck supple  Cardiovascular: Normal rate, regular rhythm and normal heart sounds  Pulmonary/Chest: Effort normal and breath sounds normal    Abdominal: Soft  Bowel sounds are normal    Musculoskeletal: Normal range of motion  She exhibits no tenderness  Hump at the back of the neck? Vs fat pad, soft, non tender   Neurological: She is alert and oriented to person, place, and time  She has normal reflexes  Coordination normal    Skin: Skin is warm  Pea sized lump in the R axilla, mobile   Psychiatric: She has a normal mood and affect  Her behavior is normal  Judgment and thought content normal    Vitals reviewed

## 2019-01-17 ENCOUNTER — HOSPITAL ENCOUNTER (OUTPATIENT)
Dept: RADIOLOGY | Facility: MEDICAL CENTER | Age: 44
Discharge: HOME | End: 2019-01-17
Payer: COMMERCIAL

## 2019-01-17 DIAGNOSIS — R22.31 AXILLARY LUMP, RIGHT: ICD-10-CM

## 2019-01-17 PROCEDURE — 76882 US LMTD JT/FCL EVL NVASC XTR: CPT

## 2019-01-18 ENCOUNTER — APPOINTMENT (OUTPATIENT)
Dept: LAB | Facility: MEDICAL CENTER | Age: 44
End: 2019-01-18
Payer: COMMERCIAL

## 2019-01-18 DIAGNOSIS — M19.041 ARTHRITIS OF BOTH HANDS: ICD-10-CM

## 2019-01-18 DIAGNOSIS — Z00.00 ROUTINE ADULT HEALTH MAINTENANCE: ICD-10-CM

## 2019-01-18 DIAGNOSIS — M19.042 ARTHRITIS OF BOTH HANDS: ICD-10-CM

## 2019-01-18 LAB
ALBUMIN SERPL BCP-MCNC: 4 G/DL (ref 3.5–5)
ALP SERPL-CCNC: 86 U/L (ref 46–116)
ALT SERPL W P-5'-P-CCNC: 26 U/L (ref 12–78)
ANION GAP SERPL CALCULATED.3IONS-SCNC: 6 MMOL/L (ref 4–13)
AST SERPL W P-5'-P-CCNC: 17 U/L (ref 5–45)
BASOPHILS # BLD AUTO: 0.03 THOUSANDS/ΜL (ref 0–0.1)
BASOPHILS NFR BLD AUTO: 1 % (ref 0–1)
BILIRUB SERPL-MCNC: 0.48 MG/DL (ref 0.2–1)
BUN SERPL-MCNC: 13 MG/DL (ref 5–25)
CALCIUM SERPL-MCNC: 9.5 MG/DL (ref 8.3–10.1)
CHLORIDE SERPL-SCNC: 106 MMOL/L (ref 100–108)
CHOLEST SERPL-MCNC: 223 MG/DL (ref 50–200)
CO2 SERPL-SCNC: 24 MMOL/L (ref 21–32)
CREAT SERPL-MCNC: 0.72 MG/DL (ref 0.6–1.3)
EOSINOPHIL # BLD AUTO: 0.09 THOUSAND/ΜL (ref 0–0.61)
EOSINOPHIL NFR BLD AUTO: 1 % (ref 0–6)
ERYTHROCYTE [DISTWIDTH] IN BLOOD BY AUTOMATED COUNT: 12.7 % (ref 11.6–15.1)
ERYTHROCYTE [SEDIMENTATION RATE] IN BLOOD: 7 MM/HOUR (ref 0–20)
GFR SERPL CREATININE-BSD FRML MDRD: 103 ML/MIN/1.73SQ M
GLUCOSE P FAST SERPL-MCNC: 78 MG/DL (ref 65–99)
HCT VFR BLD AUTO: 43.8 % (ref 34.8–46.1)
HDLC SERPL-MCNC: 43 MG/DL (ref 40–60)
HGB BLD-MCNC: 14.3 G/DL (ref 11.5–15.4)
IMM GRANULOCYTES # BLD AUTO: 0.02 THOUSAND/UL (ref 0–0.2)
IMM GRANULOCYTES NFR BLD AUTO: 0 % (ref 0–2)
LDLC SERPL CALC-MCNC: 159 MG/DL (ref 0–100)
LYMPHOCYTES # BLD AUTO: 1.88 THOUSANDS/ΜL (ref 0.6–4.47)
LYMPHOCYTES NFR BLD AUTO: 30 % (ref 14–44)
MCH RBC QN AUTO: 30.2 PG (ref 26.8–34.3)
MCHC RBC AUTO-ENTMCNC: 32.6 G/DL (ref 31.4–37.4)
MCV RBC AUTO: 92 FL (ref 82–98)
MONOCYTES # BLD AUTO: 0.37 THOUSAND/ΜL (ref 0.17–1.22)
MONOCYTES NFR BLD AUTO: 6 % (ref 4–12)
NEUTROPHILS # BLD AUTO: 3.97 THOUSANDS/ΜL (ref 1.85–7.62)
NEUTS SEG NFR BLD AUTO: 62 % (ref 43–75)
NONHDLC SERPL-MCNC: 180 MG/DL
NRBC BLD AUTO-RTO: 0 /100 WBCS
PLATELET # BLD AUTO: 283 THOUSANDS/UL (ref 149–390)
PMV BLD AUTO: 9.9 FL (ref 8.9–12.7)
POTASSIUM SERPL-SCNC: 4.1 MMOL/L (ref 3.5–5.3)
PROT SERPL-MCNC: 7.9 G/DL (ref 6.4–8.2)
RBC # BLD AUTO: 4.74 MILLION/UL (ref 3.81–5.12)
SODIUM SERPL-SCNC: 136 MMOL/L (ref 136–145)
TRIGL SERPL-MCNC: 106 MG/DL
TSH SERPL DL<=0.05 MIU/L-ACNC: 0.77 UIU/ML (ref 0.36–3.74)
WBC # BLD AUTO: 6.36 THOUSAND/UL (ref 4.31–10.16)

## 2019-01-18 PROCEDURE — 80061 LIPID PANEL: CPT

## 2019-01-18 PROCEDURE — 36415 COLL VENOUS BLD VENIPUNCTURE: CPT

## 2019-01-18 PROCEDURE — 86431 RHEUMATOID FACTOR QUANT: CPT

## 2019-01-18 PROCEDURE — 86430 RHEUMATOID FACTOR TEST QUAL: CPT

## 2019-01-18 PROCEDURE — 85025 COMPLETE CBC W/AUTO DIFF WBC: CPT

## 2019-01-18 PROCEDURE — 86038 ANTINUCLEAR ANTIBODIES: CPT

## 2019-01-18 PROCEDURE — 80053 COMPREHEN METABOLIC PANEL: CPT

## 2019-01-18 PROCEDURE — 86618 LYME DISEASE ANTIBODY: CPT

## 2019-01-18 PROCEDURE — 85652 RBC SED RATE AUTOMATED: CPT

## 2019-01-18 PROCEDURE — 84443 ASSAY THYROID STIM HORMONE: CPT

## 2019-01-20 LAB
B BURGDOR IGG SER IA-ACNC: 0.1
B BURGDOR IGM SER IA-ACNC: 0.58
CRYOGLOB RF SER-ACNC: ABNORMAL [IU]/ML
RHEUMATOID FACT SER QL LA: POSITIVE

## 2019-01-21 DIAGNOSIS — M05.80 POLYARTHRITIS WITH POSITIVE RHEUMATOID FACTOR (HCC): Primary | ICD-10-CM

## 2019-01-21 LAB — RYE IGE QN: NEGATIVE

## 2019-01-21 NOTE — PROGRESS NOTES
TC to pt with pos RF and 80 quant factor  Pt referred to rheum  TSH normal, lyme neg   US shows benign lymph node or seb cyst, pt to let us know if it becomes red or enlarged, more painful, etc

## 2019-02-07 ENCOUNTER — OFFICE VISIT (OUTPATIENT)
Dept: RHEUMATOLOGY | Facility: CLINIC | Age: 44
End: 2019-02-07
Payer: COMMERCIAL

## 2019-02-07 ENCOUNTER — APPOINTMENT (OUTPATIENT)
Dept: RADIOLOGY | Facility: CLINIC | Age: 44
End: 2019-02-07
Payer: COMMERCIAL

## 2019-02-07 VITALS
DIASTOLIC BLOOD PRESSURE: 76 MMHG | HEIGHT: 68 IN | HEART RATE: 76 BPM | WEIGHT: 181 LBS | RESPIRATION RATE: 98 BRPM | SYSTOLIC BLOOD PRESSURE: 128 MMHG | BODY MASS INDEX: 27.43 KG/M2

## 2019-02-07 DIAGNOSIS — M05.9 SEROPOSITIVE RHEUMATOID ARTHRITIS (HCC): ICD-10-CM

## 2019-02-07 DIAGNOSIS — M05.9 SEROPOSITIVE RHEUMATOID ARTHRITIS (HCC): Primary | ICD-10-CM

## 2019-02-07 DIAGNOSIS — M05.80 POLYARTHRITIS WITH POSITIVE RHEUMATOID FACTOR (HCC): ICD-10-CM

## 2019-02-07 PROCEDURE — 73630 X-RAY EXAM OF FOOT: CPT

## 2019-02-07 PROCEDURE — 99245 OFF/OP CONSLTJ NEW/EST HI 55: CPT | Performed by: INTERNAL MEDICINE

## 2019-02-07 PROCEDURE — 73130 X-RAY EXAM OF HAND: CPT

## 2019-02-07 NOTE — PROGRESS NOTES
Assessment and Plan:   Ms Marissa Persaud is a 27-year-old female with no significant past medical history, who presents for further evaluation of diffuse joint pains and a positive rheumatoid factor at a titer of 80  She is referred by Dr Surendra Harman presents today for further evaluation of diffuse arthralgias, predominantly affecting her bilateral hands and feet which have been gradually progressive over the past 1 year  She does report additional symptoms concerning for an underlying inflammatory arthritis such as hand/wrist swelling and prolonged morning stiffness  She also presents with evidence of synovitis at various PIP joints and at her left wrist, and was found to have a positive rheumatoid factor  Given the constellation of findings, I suspect her diagnosis is consistent with seropositive rheumatoid arthritis  I would like to obtain the additional testing as listed below, and see her back in the office in 2 weeks to discuss definitive treatment  I did provide her with additional reading information on oral methotrexate, as this is likely the medication option I will offer her  - I also offered her a short course of steroids to suppress the ongoing inflammation, but she declines for now  I advised her she can use NSAIDs as needed to help with her symptoms until the next office visit  Plan:  Diagnoses and all orders for this visit:    Seropositive rheumatoid arthritis ([facility] )  -     Chronic Hepatitis Panel; Future  -     Cyclic citrul peptide antibody, IgG; Future  -     XR hand 3+ vw right; Future  -     XR hand 3+ vw left; Future  -     XR foot 3+ vw right; Future  -     C-reactive protein; Future    Polyarthritis with positive rheumatoid factor ([facility] 75 )  -     Ambulatory referral to Rheumatology      I have personally reviewed pertinent films in PACS which did not show any erosive arthropathy of the left foot       Activities as tolerated    Diet: low carb/low fat, more greens/vegetables, adequate hydration  Exercise: try to maintain a low impact exercise regimen as much as possible  Walk for 30 minutes a day for at least 3 days a week    Encouraged to maintain good sleep hygiene  Continue other medications as prescribed by PCP and other specialists        RTC in 2 weeks  HPI  Ms Lex Dunn is a 60-year-old female with no significant past medical history, who presents for further evaluation of diffuse joint pains and a positive rheumatoid factor at a titer of 80  She is referred by Dr Gabriela Cole  Patient reports she has been experiencing diffuse joint pains over the past 2-3 years, but states in the past 1 year she has noticed worsening pain affecting her bilateral hands and feet  She says the symptoms have been gradually progressive over time, and affect her on a daily basis in a mostly constant manner  She does also intermittently experience pain at her wrists, shoulders, hips, knees and ankles, but these joint pains affect her more intermittently  She has noticed swelling affecting her bilateral hands and wrists, and does present with swelling of her left wrist today  She also experiences morning stiffness which affects her diffusely, including her hands, and states it lasts at least 1 hour but on occasion can persist throughout the day  She has not tried taking any over-the-counter pain medications  She has custody of her 3 grand children, and states repetitive hand movements required while caring for them (such as buttoning) really aggravates her symptoms  She was seen by her primary care physician in view of these complaints and had additional testing done which revealed a positive rheumatoid factor at a titer of 80  CBC, CMP, DINA screen, Lyme antibody profile, ESR and TSH were unremarkable  She has not had any hand x-rays done  She also reports a history of dry skin affecting her palms, but states this has currently resolved with the use of moisturizers    She otherwise denies any other skin rash or history of psoriasis  No family history of autoimmune disease  The following portions of the patient's history were reviewed and updated as appropriate: allergies, current medications, past family history, past medical history, past social history, past surgical history and problem list       Review of Systems  Constitutional: Negative for fevers, chills, night sweats, fatigue  Positive for weight gain  ENT/Mouth: Negative for hearing changes, ear pain, nasal congestion, sinus pain, hoarseness, sore throat, rhinorrhea, swallowing difficulty  Eyes: Negative for pain, redness, discharge, vision changes  Cardiovascular: Negative for chest pain, SOB, palpitations  Respiratory: Negative for cough, sputum, wheezing, dyspnea  Gastrointestinal: Negative for nausea, vomiting, diarrhea, constipation, pain, heartburn  Genitourinary: Negative for dysuria, hematuria  Positive for urinary frequency  Musculoskeletal: As per HPI  Skin: Negative for skin rash, color changes  Positive for dry skin  Neuro: Negative for weakness, numbness, tingling, loss of consciousness  Psych: Negative for anxiety, depression  Heme/Lymph: Negative for easy bruising, bleeding, lymphadenopathy  Past Medical History:   Diagnosis Date    Disease of thyroid gland        Past Surgical History:   Procedure Laterality Date    HYSTERECTOMY      TOTAL ABDOMINAL HYSTERECTOMY W/ BILATERAL SALPINGOOPHORECTOMY      TUBAL LIGATION         Social History     Social History    Marital status: /Civil Union     Spouse name: N/A    Number of children: 3    Years of education: N/A     Occupational History     [employer]     Social History Main Topics    Smoking status: Current Every Day Smoker     Packs/day: 1 00     Types: Cigarettes    Smokeless tobacco: Never Used    Alcohol use No      Comment: Per [employer];  Social alcohol use    Drug use: No    Sexual activity: Yes Other Topics Concern    Not on file     Social History Narrative    Daily coffee consumption    Uses seatbelts       Family History   Problem Relation Age of Onset    Ovarian cancer Mother     Pancreatic cancer Maternal Grandfather     Thyroid cancer Maternal Aunt     Liver cancer Maternal Uncle     Pancreatic cancer Maternal Uncle        Allergies   Allergen Reactions    Penicillins        No current outpatient prescriptions on file  Objective:    Vitals:    02/07/19 1040   BP: 128/76   Pulse: 76   Resp: (!) 98   Weight: 82 1 kg (181 lb)   Height: 5' 8" (1 727 m)       Physical Exam  General: Well appearing, well nourished, in no distress  Oriented x 3, normal mood and affect  Ambulating without difficulty  Skin: Good turgor, no rash, unusual bruising or prominent lesions  Hair: Normal texture and distribution  Nails: Normal color, no deformities  HEENT:  Head: Normocephalic, atraumatic  Eyes: Conjunctiva clear, sclera non-icteric, EOM intact  Nose: No external lesions, mucosa non-inflamed  Mouth: Mucous membranes moist, no mucosal lesions  Neck: Supple, thyroid non-enlarged and non-tender  No lymphadenopathy  Extremities: No amputations or deformities, cyanosis, edema  Musculoskeletal:   Hands - she has tenderness diffusely at bilateral PIP joints  DIPs and MCPs are not tender  I do not appreciate any discrete evidence of synovitis at the joints on her right hand  She has mild soft tissue swelling noted at her left hand 3rd and 4th PIPs  She is able to make full fists bilaterally  Negative Phalen and Tinel sign  Negative Finkelstein test   Wrists - right wrist is unremarkable  She demonstrates evidence of soft tissue swelling at her left wrist, which is tender to palpation  She is able to perform full range of motion  Elbows and shoulders - unremarkable  Hips, knees and ankles - unremarkable  Feet - positive MTP squeeze test bilaterally    Negative fibromyalgia tender points  Neurologic: Alert and oriented  No focal neurological deficits appreciated  Psychiatric: Normal mood and affect  WISAM Styles    Rheumatology

## 2019-02-07 NOTE — LETTER
February 7, 2019     Bernardo Mustafa MD  [address]    Patient: Margarito Haney   YOB: 1975   Date of Visit: 2/7/2019       Dear Dr Nya Greene: Thank you for referring Syed Reinoso to me for evaluation  Below are my notes for this consultation  If you have questions, please do not hesitate to call me  I look forward to following your patient along with you  Sincerely,        Kervin Ward MD        CC: No Recipients  Kervin Ward MD  2/7/2019 11:24 AM  Sign at close encounter  Assessment and Plan:   Ms Isamar Everett is a 45-year-old female with no significant past medical history, who presents for further evaluation of diffuse joint pains and a positive rheumatoid factor at a titer of 80  She is referred by Dr Nya Byrdradha presents today for further evaluation of diffuse arthralgias, predominantly affecting her bilateral hands and feet which have been gradually progressive over the past 1 year  She does report additional symptoms concerning for an underlying inflammatory arthritis such as hand/wrist swelling and prolonged morning stiffness  She also presents with evidence of synovitis at various PIP joints and at her left wrist, and was found to have a positive rheumatoid factor  Given the constellation of findings, I suspect her diagnosis is consistent with seropositive rheumatoid arthritis  I would like to obtain the additional testing as listed below, and see her back in the office in 2 weeks to discuss definitive treatment  I did provide her with additional reading information on oral methotrexate, as this is likely the medication option I will offer her  - I also offered her a short course of steroids to suppress the ongoing inflammation, but she declines for now  I advised her she can use NSAIDs as needed to help with her symptoms until the next office visit        Plan:  Diagnoses and all orders for this visit:    Seropositive rheumatoid arthritis ([facility] )  -     Chronic Hepatitis Panel; Future  -     Cyclic citrul peptide antibody, IgG; Future  -     XR hand 3+ vw right; Future  -     XR hand 3+ vw left; Future  -     XR foot 3+ vw right; Future  -     C-reactive protein; Future    Polyarthritis with positive rheumatoid factor ([facility] )  -     Ambulatory referral to Rheumatology      I have personally reviewed pertinent films in PACS which did not show any erosive arthropathy of the left foot  Activities as tolerated    Diet: low carb/low fat, more greens/vegetables, adequate hydration  Exercise: try to maintain a low impact exercise regimen as much as possible  Walk for 30 minutes a day for at least 3 days a week    Encouraged to maintain good sleep hygiene  Continue other medications as prescribed by PCP and other specialists        RTC in 2 weeks  HPI  Ms José Miguel Mckinnon is a 40-year-old female with no significant past medical history, who presents for further evaluation of diffuse joint pains and a positive rheumatoid factor at a titer of 80  She is referred by Dr Javier Cary  Patient reports she has been experiencing diffuse joint pains over the past 2-3 years, but states in the past 1 year she has noticed worsening pain affecting her bilateral hands and feet  She says the symptoms have been gradually progressive over time, and affect her on a daily basis in a mostly constant manner  She does also intermittently experience pain at her wrists, shoulders, hips, knees and ankles, but these joint pains affect her more intermittently  She has noticed swelling affecting her bilateral hands and wrists, and does present with swelling of her left wrist today  She also experiences morning stiffness which affects her diffusely, including her hands, and states it lasts at least 1 hour but on occasion can persist throughout the day  She has not tried taking any over-the-counter pain medications    She has custody of her 3 grand children, and states repetitive hand movements required while caring for them (such as buttoning) really aggravates her symptoms  She was seen by her primary care physician in view of these complaints and had additional testing done which revealed a positive rheumatoid factor at a titer of 80  CBC, CMP, DINA screen, Lyme antibody profile, ESR and TSH were unremarkable  She has not had any hand x-rays done  She also reports a history of dry skin affecting her palms, but states this has currently resolved with the use of moisturizers  She otherwise denies any other skin rash or history of psoriasis  No family history of autoimmune disease  The following portions of the patient's history were reviewed and updated as appropriate: allergies, current medications, past family history, past medical history, past social history, past surgical history and problem list       Review of Systems  Constitutional: Negative for fevers, chills, night sweats, fatigue  Positive for weight gain  ENT/Mouth: Negative for hearing changes, ear pain, nasal congestion, sinus pain, hoarseness, sore throat, rhinorrhea, swallowing difficulty  Eyes: Negative for pain, redness, discharge, vision changes  Cardiovascular: Negative for chest pain, SOB, palpitations  Respiratory: Negative for cough, sputum, wheezing, dyspnea  Gastrointestinal: Negative for nausea, vomiting, diarrhea, constipation, pain, heartburn  Genitourinary: Negative for dysuria, hematuria  Positive for urinary frequency  Musculoskeletal: As per HPI  Skin: Negative for skin rash, color changes  Positive for dry skin  Neuro: Negative for weakness, numbness, tingling, loss of consciousness  Psych: Negative for anxiety, depression  Heme/Lymph: Negative for easy bruising, bleeding, lymphadenopathy          Past Medical History:   Diagnosis Date    Disease of thyroid gland        Past Surgical History:   Procedure Laterality Date    HYSTERECTOMY      TOTAL ABDOMINAL HYSTERECTOMY W/ BILATERAL SALPINGOOPHORECTOMY      TUBAL LIGATION         Social History     Social History    Marital status: /Civil Union     Spouse name: N/A    Number of children: 3    Years of education: N/A     Occupational History     [employer]     Social History Main Topics    Smoking status: Current Every Day Smoker     Packs/day: 1 00     Types: Cigarettes    Smokeless tobacco: Never Used    Alcohol use No      Comment: Per [employer]scripts; Social alcohol use    Drug use: No    Sexual activity: Yes     Other Topics Concern    Not on file     Social History Narrative    Daily coffee consumption    Uses seatbelts       Family History   Problem Relation Age of Onset    Ovarian cancer Mother     Pancreatic cancer Maternal Grandfather     Thyroid cancer Maternal Aunt     Liver cancer Maternal Uncle     Pancreatic cancer Maternal Uncle        Allergies   Allergen Reactions    Penicillins        No current outpatient prescriptions on file  Objective:    Vitals:    02/07/19 1040   BP: 128/76   Pulse: 76   Resp: (!) 98   Weight: 82 1 kg (181 lb)   Height: 5' 8" (1 727 m)       Physical Exam  General: Well appearing, well nourished, in no distress  Oriented x 3, normal mood and affect  Ambulating without difficulty  Skin: Good turgor, no rash, unusual bruising or prominent lesions  Hair: Normal texture and distribution  Nails: Normal color, no deformities  HEENT:  Head: Normocephalic, atraumatic  Eyes: Conjunctiva clear, sclera non-icteric, EOM intact  Nose: No external lesions, mucosa non-inflamed  Mouth: Mucous membranes moist, no mucosal lesions  Neck: Supple, thyroid non-enlarged and non-tender  No lymphadenopathy  Extremities: No amputations or deformities, cyanosis, edema  Musculoskeletal:   Hands - she has tenderness diffusely at bilateral PIP joints  DIPs and MCPs are not tender    I do not appreciate any discrete evidence of synovitis at the joints on her right hand  She has mild soft tissue swelling noted at her left hand 3rd and 4th PIPs  She is able to make full fists bilaterally  Negative Phalen and Tinel sign  Negative Finkelstein test   Wrists - right wrist is unremarkable  She demonstrates evidence of soft tissue swelling at her left wrist, which is tender to palpation  She is able to perform full range of motion  Elbows and shoulders - unremarkable  Hips, knees and ankles - unremarkable  Feet - positive MTP squeeze test bilaterally  Negative fibromyalgia tender points  Neurologic: Alert and oriented  No focal neurological deficits appreciated  Psychiatric: Normal mood and affect  Dorthey Schirmer, M D    Rheumatology

## 2019-02-07 NOTE — PATIENT INSTRUCTIONS
Methotrexate (By mouth)   Methotrexate (meth-oh-TREX-ate)  Treats several kinds of cancer, including cancer of the blood, bone, lung, breast, head, or neck  Also treats rheumatoid arthritis and psoriasis (a skin disease)  Brand Name(s): Trexall   There may be other brand names for this medicine  When This Medicine Should Not Be Used: You should not use this medicine if you have had an allergic reaction to methotrexate or if you are breastfeeding  You should not use this medicine for arthritis or psoriasis if you are pregnant, or if you have liver disease or certain problems with your blood or immune system  Make sure your doctor knows if you drink alcohol of any kind on a regular basis  How to Use This Medicine:   Tablet  · Medicines used to treat cancer are very strong and can have many side effects  Before receiving this medicine, make sure you understand all the risks and benefits  It is important for you to work closely with your doctor during your treatment  · Your doctor will tell you how much medicine to use  Do not use more than directed  The correct schedule for this medicine is different for arthritis, psoriasis, and different kinds of cancer  For example, people who have arthritis or psoriasis often take this medicine only once a week  · Make sure you understand your personal dosing schedule  Ask your doctor and pharmacist if you are not sure when or how often to take your medicine  · If you take the medicine only once a week, it is best to take it on the same day each week  · Write down on a calendar or piece of paper when you are supposed to use your medicine  Keep the calendar or paper where you can see it  After you take your dose of medicine, cross off that date on your calendar or paper  This will help you remember if you took the medicine or if you still need to take it  If you need other ideas to help you keep track of your schedule, ask your pharmacist or other healthcare provider    If a dose is missed:   · Take a dose as soon as you remember  If it is almost time for your next dose, wait until then and take a regular dose  Do not take extra medicine to make up for a missed dose  · If you use this medicine only once a week and you miss a dose or forget to use your medicine, skip the missed dose and use your medicine as soon as possible  Return to your regular schedule the following week  Do not use extra medicine to make up for a missed dose  How to Store and Dispose of This Medicine:   · Store the medicine in a closed container at room temperature, away from heat, moisture, and direct light  · Ask your pharmacist, doctor, or health caregiver about the best way to dispose of any outdated medicine or medicine no longer needed  · Keep all medicine out of the reach of children  Never share your medicine with anyone  Drugs and Foods to Avoid:   Ask your doctor or pharmacist before using any other medicine, including over-the-counter medicines, vitamins, and herbal products  · There are many drugs that can interact with methotrexate  Some of these drugs are aspirin, phenytoin (Dilantin®), theophylline (Collins-Dur®), antibiotics (such as penicillin, tetracycline, Bactrim®, Septra®), and vitamin supplements that contain folic acid  Make sure your doctor knows about ALL other medicines you are using  · If you have bone cancer, ask your doctor if you need to avoid using pain or arthritis medicine (such as aspirin, diclofenac, ibuprofen, indomethacin, Advil®, Aleve®, Bextra®, Celebrex®) or steroid medicines (such as prednisone)  · If you have arthritis, make sure your doctor knows about all other medicines you are using to treat arthritis (such as aspirin, gold, ibuprofen, prednisone, sulfasalazine, Azulfidine®, Celebrex®, Plaquenil®)  · Make sure your doctor knows about any cancer treatments you are using, including cisplatin ([address]) or radiation    · Tell your doctor if you have used methotrexate before for any reason  · Talk to your doctor before getting flu shots or other vaccines while you are receiving methotrexate  Vaccines may not work as well while you are using this medicine  Warnings While Using This Medicine:   · Using this medicine while you are pregnant can harm your unborn baby  The medicine may also cause birth defects if the father is using it when his sexual partner becomes pregnant  Use an effective form of birth control during treatment with methotrexate  For a man, continue birth control for at least 3 months after stopping treatment  For a woman, continue birth control until you have had two menstrual periods after stopping treatment  If a pregnancy occurs while you are using this medicine, tell your doctor right away  · Make sure your doctor knows if you have kidney disease, liver disease, a stomach ulcer, colitis, diabetes, hepatitis, HIV or AIDS, problems with your immune system, or any kind of problem with your blood (such as anemia)  Tell your doctor if you get an infection of any kind  · This medicine may make your skin more sensitive to sunlight  Wear sunscreen  Do not use sunlamps or tanning beds  Tell your doctor if you have increased skin redness or other problems  · You may get infections more easily while you are using this medicine  Stay away from people with colds, flu, or other infections  Wash your hands often  · Your doctor will need to check your progress at regular visits while you are using this medicine  You may need to have blood tests or other kinds of medical tests  Be sure to keep all appointments  · Call your doctor right away if you think you have taken too much of this medicine    Possible Side Effects While Using This Medicine:   Call your doctor right away if you notice any of these side effects:  · Allergic reaction: Itching or hives, swelling in your face or hands, swelling or tingling in your mouth or throat, chest tightness, trouble breathing  · Blistering, peeling, red skin rash  · Cough, fever, chest pain, trouble breathing, blue lips or fingers  · Eyes or skin turn yellow, or dark-colored urine or pale stools  · Fever  · Nausea, vomiting, diarrhea, loss of appetite, stomach pain  · Sores or white patches on your lips, mouth, or throat  · Unusual bleeding, bruising, or weakness  If you notice these less serious side effects, talk with your doctor:   · Hair loss, headache, dizziness  If you notice other side effects that you think are caused by this medicine, tell your doctor  Call your doctor for medical advice about side effects  You may report side effects to FDA at 0-827-FDA-7752  © 2017 [employer] Information is for End User's use only and may not be sold, redistributed or otherwise used for commercial purposes  The above information is an  only  It is not intended as medical advice for individual conditions or treatments  Talk to your doctor, nurse or pharmacist before following any medical regimen to see if it is safe and effective for you

## 2019-02-08 ENCOUNTER — APPOINTMENT (OUTPATIENT)
Dept: LAB | Facility: MEDICAL CENTER | Age: 44
End: 2019-02-08
Payer: COMMERCIAL

## 2019-02-08 DIAGNOSIS — R76.8 HEPATITIS B SURFACE ANTIGEN POSITIVE: ICD-10-CM

## 2019-02-08 DIAGNOSIS — M05.9 SEROPOSITIVE RHEUMATOID ARTHRITIS (HCC): ICD-10-CM

## 2019-02-08 LAB — CRP SERPL QL: 9.5 MG/L

## 2019-02-08 PROCEDURE — 86803 HEPATITIS C AB TEST: CPT

## 2019-02-08 PROCEDURE — 36415 COLL VENOUS BLD VENIPUNCTURE: CPT

## 2019-02-08 PROCEDURE — 87517 HEPATITIS B DNA QUANT: CPT

## 2019-02-08 PROCEDURE — 87340 HEPATITIS B SURFACE AG IA: CPT

## 2019-02-08 PROCEDURE — 86140 C-REACTIVE PROTEIN: CPT

## 2019-02-08 PROCEDURE — 86704 HEP B CORE ANTIBODY TOTAL: CPT

## 2019-02-08 PROCEDURE — 86200 CCP ANTIBODY: CPT

## 2019-02-08 PROCEDURE — 86705 HEP B CORE ANTIBODY IGM: CPT

## 2019-02-09 LAB
HBV CORE AB SER QL: REACTIVE
HBV CORE IGM SER QL: ABNORMAL
HBV SURFACE AG SER QL: ABNORMAL
HCV AB SER QL: ABNORMAL

## 2019-02-11 ENCOUNTER — TELEPHONE (OUTPATIENT)
Dept: RHEUMATOLOGY | Facility: CLINIC | Age: 44
End: 2019-02-11

## 2019-02-11 DIAGNOSIS — R76.8 HEPATITIS B SURFACE ANTIGEN POSITIVE: Primary | ICD-10-CM

## 2019-02-12 LAB — CCP IGA+IGG SERPL IA-ACNC: 6 UNITS (ref 0–19)

## 2019-02-12 NOTE — TELEPHONE ENCOUNTER
Please check with lab if they can add on HBV DNA  If they cannot call patient to have additional lab draw  Thanks

## 2019-02-14 LAB
HBV DNA SERPL NAA+PROBE-ACNC: NORMAL IU/ML
HBV DNA SERPL NAA+PROBE-LOG IU: NORMAL LOG10 IU/ML
REF LAB TEST REF RANGE: NORMAL

## 2019-02-15 ENCOUNTER — TELEPHONE (OUTPATIENT)
Dept: RHEUMATOLOGY | Facility: CLINIC | Age: 44
End: 2019-02-15

## 2019-02-15 DIAGNOSIS — R76.8 POSITIVE HEPATITIS TEST: Primary | ICD-10-CM

## 2019-02-18 NOTE — TELEPHONE ENCOUNTER
Juliana patient and asked her to Please go and have these labs drawn as requested  Patient says that she will

## 2019-02-19 ENCOUNTER — APPOINTMENT (OUTPATIENT)
Dept: LAB | Facility: MEDICAL CENTER | Age: 44
End: 2019-02-19
Payer: COMMERCIAL

## 2019-02-19 DIAGNOSIS — R76.8 POSITIVE HEPATITIS TEST: ICD-10-CM

## 2019-02-19 PROCEDURE — 87517 HEPATITIS B DNA QUANT: CPT

## 2019-02-19 PROCEDURE — 36415 COLL VENOUS BLD VENIPUNCTURE: CPT

## 2019-02-20 LAB
HBV DNA SERPL NAA+PROBE-ACNC: 3420 IU/ML
HBV DNA SERPL NAA+PROBE-LOG IU: 3.53 LOG10 IU/ML
REF LAB TEST REF RANGE: NORMAL

## 2019-02-21 ENCOUNTER — HOSPITAL ENCOUNTER (OUTPATIENT)
Dept: RADIOLOGY | Facility: MEDICAL CENTER | Age: 44
Discharge: HOME | End: 2019-02-21
Payer: COMMERCIAL

## 2019-02-21 ENCOUNTER — TELEPHONE (OUTPATIENT)
Dept: RHEUMATOLOGY | Facility: CLINIC | Age: 44
End: 2019-02-21

## 2019-02-21 VITALS — BODY MASS INDEX: 27.43 KG/M2 | HEIGHT: 68 IN | WEIGHT: 181 LBS

## 2019-02-21 DIAGNOSIS — M19.041 ARTHRITIS OF BOTH HANDS: ICD-10-CM

## 2019-02-21 DIAGNOSIS — M19.042 ARTHRITIS OF BOTH HANDS: ICD-10-CM

## 2019-02-21 DIAGNOSIS — Z00.00 ROUTINE ADULT HEALTH MAINTENANCE: ICD-10-CM

## 2019-02-21 DIAGNOSIS — B18.1 CHRONIC VIRAL HEPATITIS B WITHOUT DELTA AGENT AND WITHOUT COMA (HCC): Primary | ICD-10-CM

## 2019-02-21 DIAGNOSIS — Z12.39 SCREENING BREAST EXAMINATION: ICD-10-CM

## 2019-02-21 PROCEDURE — 77067 SCR MAMMO BI INCL CAD: CPT

## 2019-02-21 PROCEDURE — 77063 BREAST TOMOSYNTHESIS BI: CPT

## 2019-02-21 NOTE — TELEPHONE ENCOUNTER
Called patient with results of hepatitis testing  She does not recall any exposure history  I advised her to request her  also get tested  Will refer her to Gastroenterology  Will reschedule appointment with me after she is seen by GI  Will hold off any treatment for arthritis at this time  Kathleen, please reschedule her to see me in 3 months and cancel appointment this Friday  Thanks

## 2019-02-22 ENCOUNTER — APPOINTMENT (OUTPATIENT)
Dept: LAB | Facility: AMBULARY SURGERY CENTER | Age: 44
End: 2019-02-22
Attending: INTERNAL MEDICINE
Payer: COMMERCIAL

## 2019-02-22 ENCOUNTER — OFFICE VISIT (OUTPATIENT)
Dept: GASTROENTEROLOGY | Facility: AMBULARY SURGERY CENTER | Age: 44
End: 2019-02-22
Payer: COMMERCIAL

## 2019-02-22 VITALS
HEIGHT: 68 IN | TEMPERATURE: 98 F | WEIGHT: 181 LBS | RESPIRATION RATE: 14 BRPM | HEART RATE: 90 BPM | SYSTOLIC BLOOD PRESSURE: 110 MMHG | DIASTOLIC BLOOD PRESSURE: 80 MMHG | BODY MASS INDEX: 27.43 KG/M2

## 2019-02-22 DIAGNOSIS — R76.8 HEPATITIS B SURFACE ANTIGEN POSITIVE: Primary | ICD-10-CM

## 2019-02-22 DIAGNOSIS — R76.8 HEPATITIS B SURFACE ANTIGEN POSITIVE: ICD-10-CM

## 2019-02-22 LAB
INR PPP: 0.95 (ref 0.86–1.17)
PROTHROMBIN TIME: 12.8 SECONDS (ref 11.8–14.2)

## 2019-02-22 PROCEDURE — 87350 HEPATITIS BE AG IA: CPT

## 2019-02-22 PROCEDURE — 86706 HEP B SURFACE ANTIBODY: CPT

## 2019-02-22 PROCEDURE — 99244 OFF/OP CNSLTJ NEW/EST MOD 40: CPT | Performed by: INTERNAL MEDICINE

## 2019-02-22 PROCEDURE — 86707 HEPATITIS BE ANTIBODY: CPT

## 2019-02-22 PROCEDURE — 85610 PROTHROMBIN TIME: CPT

## 2019-02-22 PROCEDURE — 36415 COLL VENOUS BLD VENIPUNCTURE: CPT

## 2019-02-22 PROCEDURE — 87389 HIV-1 AG W/HIV-1&-2 AB AG IA: CPT

## 2019-02-22 NOTE — PROGRESS NOTES
Consultation -  [facility]  Chayito Kirkland 37 y o  female MRN: [de-identified]          Assessment & Plan:    Pleasant 44-year-old female, noted to have B positive viral load in surface antigen in preparation for possible biologic therapy for rheumatoid arthritis  1  Hepatitis-B surface antigen positive with positive viral load:  -normal LFTs  -immune tolerant phase  -given the potential for reactivation in the setting of immunosuppressive therapy she will likely need to be on hep B prophylactic treatment  -we will check a right upper quadrant ultrasound as well as early antigen  -once we have the results, will discuss with the patient and Rheumatology, with regards to timing to initiate biologic therapy  -if the patient is not going to be started on steroids, immunosuppressive therapy or biologic therapy could hold off on treating hepatitis-B at this time given the low viral load and normal LFTs  -we will check a right upper quadrant ultrasound with elastography  -we can consider liver biopsy if indicated  -we will have the patient follow up in short interval follow-up once we had repeat laboratory studies  -tried to reassure the patient discussed possible transmission risk factors  -we will check HIV testing, additionally her spouse she would get tested    _____________________________________________________________        CC:  Hep B positive serologies    HPI:  Chayito Kirkland is a 37 y  o female who was referred for evaluation of hep B positive serologies  As you know this is a pleasant 44-year-old female recently diagnosed with rheumatoid arthritis, was undergoing evaluation but rheumatologist, in workup prior to initiating biologic therapy patient had hepatitis serologies checked which came back positive, confirmed hep B surface antigen positive with hep B viral load detectable  Patient reports she had an episode of viral hepatitis about 20 years ago    She has no known exposure risk, denies any history of drug use, tattoos, blood transfusions, high risk sexual behavior  She has had 2 children in the past and denies any history of hepatitis B during her pregnancies  She denies any abdominal pain, nausea, vomiting  She is notable for surgical history of tubal ligation and hysterectomy  She smokes about a pack a day  Denies any alcohol  No family history of liver disease though her uncle  of liver cancer  There is a history of pancreatic cancer in uncle and grandfather on her maternal side  ROS:  The remainder of the ROS was negative except for the pertinent positives mentioned in HPI  Allergies: Penicillins    Medications: No current outpatient medications on file '    Past Medical History:   Diagnosis Date    Disease of thyroid gland        Past Surgical History:   Procedure Laterality Date    HYSTERECTOMY      TOTAL ABDOMINAL HYSTERECTOMY W/ BILATERAL SALPINGOOPHORECTOMY      TUBAL LIGATION         Family History   Problem Relation Age of Onset    Ovarian cancer Mother     Pancreatic cancer Maternal Grandfather     Thyroid cancer Maternal Aunt     Liver cancer Maternal Uncle     Pancreatic cancer Maternal Uncle         reports that she has been smoking cigarettes  She has been smoking about 1 00 pack per day  She has never used smokeless tobacco  She reports that she does not drink alcohol or use drugs            Physical Exam:     /80 (BP Location: Left arm, Patient Position: Sitting, Cuff Size: Standard)   Pulse 90   Temp 98 °F (36 7 °C) (Tympanic)   Resp 14   Ht 5' 8" (1 727 m)   Wt 82 1 kg (181 lb)   BMI 27 52 kg/m²     Gen: wn/wd, NAD, healthy-appearing female  HEENT: anicteric, MMM, no cervical LAD  CVS: RRR, no m/r/g  CHEST: CTA b/l  ABD: +BS, soft, NT,ND, no hepatosplenomegaly  EXT: no c/c/e  NEURO: aaox3  SKIN: NO rashes, no stigmata of chronic liver disease

## 2019-02-22 NOTE — LETTER
February 22, 2019     Magdiel Handy PA-C  [address]    Patient: Mat White   YOB: 1975   Date of Visit: 2/22/2019       Dear Dr Angus Perez:    Thank you for referring Serg Reid to me for evaluation  Below are my notes for this consultation  If you have questions, please do not hesitate to call me  I look forward to following your patient along with you  Sincerely,        Ping Smith MD        CC: MD Ping Lroenzana MD  2/22/2019  6:43 PM  Sign at close encounter  Consultation - [facility]  Mat White 37 y o  female MRN: [de-identified]          Assessment & Plan:    Pleasant 26-year-old female, noted to have B positive viral load in surface antigen in preparation for possible biologic therapy for rheumatoid arthritis      1  Hepatitis-B surface antigen positive with positive viral load:  -normal LFTs  -immune tolerant phase  -given the potential for reactivation in the setting of immunosuppressive therapy she will likely need to be on hep B prophylactic treatment  -we will check a right upper quadrant ultrasound as well as early antigen  -once we have the results, will discuss with the patient and Rheumatology, with regards to timing to initiate biologic therapy  -if the patient is not going to be started on steroids, immunosuppressive therapy or biologic therapy could hold off on treating hepatitis-B at this time given the low viral load and normal LFTs  -we will check a right upper quadrant ultrasound with elastography  -we can consider liver biopsy if indicated  -we will have the patient follow up in short interval follow-up once we had repeat laboratory studies  -tried to reassure the patient discussed possible transmission risk factors  -we will check HIV testing, additionally her spouse she would get tested    _____________________________________________________________        CC:  Hep B positive serologies    HPI:  Tamiko Meza is a 37 y  o female who was referred for evaluation of hep B positive serologies  As you know this is a pleasant 45-year-old female recently diagnosed with rheumatoid arthritis, was undergoing evaluation but rheumatologist, in workup prior to initiating biologic therapy patient had hepatitis serologies checked which came back positive, confirmed hep B surface antigen positive with hep B viral load detectable  Patient reports she had an episode of viral hepatitis about 20 years ago  She has no known exposure risk, denies any history of drug use, tattoos, blood transfusions, high risk sexual behavior  She has had 2 children in the past and denies any history of hepatitis B during her pregnancies  She denies any abdominal pain, nausea, vomiting  She is notable for surgical history of tubal ligation and hysterectomy  She smokes about a pack a day  Denies any alcohol  No family history of liver disease though her uncle  of liver cancer  There is a history of pancreatic cancer in uncle and grandfather on her maternal side  ROS:  The remainder of the ROS was negative except for the pertinent positives mentioned in HPI  Allergies: Penicillins    Medications: No current outpatient medications on file '    Past Medical History:   Diagnosis Date    Disease of thyroid gland        Past Surgical History:   Procedure Laterality Date    HYSTERECTOMY      TOTAL ABDOMINAL HYSTERECTOMY W/ BILATERAL SALPINGOOPHORECTOMY      TUBAL LIGATION         Family History   Problem Relation Age of Onset    Ovarian cancer Mother     Pancreatic cancer Maternal Grandfather     Thyroid cancer Maternal Aunt     Liver cancer Maternal Uncle     Pancreatic cancer Maternal Uncle         reports that she has been smoking cigarettes  She has been smoking about 1 00 pack per day   She has never used smokeless tobacco  She reports that she does not drink alcohol or use drugs           Physical Exam:     /80 (BP Location: Left arm, Patient Position: Sitting, Cuff Size: Standard)   Pulse 90   Temp 98 °F (36 7 °C) (Tympanic)   Resp 14   Ht 5' 8" (1 727 m)   Wt 82 1 kg (181 lb)   BMI 27 52 kg/m²      Gen: wn/wd, NAD, healthy-appearing female  HEENT: anicteric, MMM, no cervical LAD  CVS: RRR, no m/r/g  CHEST: CTA b/l  ABD: +BS, soft, NT,ND, no hepatosplenomegaly  EXT: no c/c/e  NEURO: aaox3  SKIN: NO rashes, no stigmata of chronic liver disease

## 2019-02-23 LAB
HBV E AG SERPL QL IA: NEGATIVE
HBV SURFACE AB SER-ACNC: 7.99 MIU/ML
HIV 1+2 AB+HIV1 P24 AG SERPL QL IA: NORMAL

## 2019-02-24 LAB — HBV E AB SERPL QL IA: POSITIVE

## 2019-02-28 ENCOUNTER — HOSPITAL ENCOUNTER (OUTPATIENT)
Dept: RADIOLOGY | Facility: HOSPITAL | Age: 44
Discharge: HOME | End: 2019-02-28
Attending: INTERNAL MEDICINE
Payer: COMMERCIAL

## 2019-02-28 DIAGNOSIS — R76.8 HEPATITIS B SURFACE ANTIGEN POSITIVE: ICD-10-CM

## 2019-02-28 PROCEDURE — 76705 ECHO EXAM OF ABDOMEN: CPT

## 2019-02-28 PROCEDURE — 76981 USE PARENCHYMA: CPT

## 2019-03-06 ENCOUNTER — TELEPHONE (OUTPATIENT)
Dept: GASTROENTEROLOGY | Facility: CLINIC | Age: 44
End: 2019-03-06

## 2019-03-06 NOTE — TELEPHONE ENCOUNTER
Spoke with patient and reviewed that ultrasound appears normal, F0  Discussed with patient that I would touch base with Dr Ortega Patrick and she is hoping to find out with the next step is in her treatment plan  Dr Ortega Patrick - RUQ U/S and elastography appeared normal   Pt hoping to find out next steps in her plan re: GI and Rheumatology  Let me know if you'd like me to relay anything further to her or help in any way

## 2019-03-12 DIAGNOSIS — M05.79 RHEUMATOID ARTHRITIS INVOLVING MULTIPLE SITES WITH POSITIVE RHEUMATOID FACTOR (HCC): Primary | ICD-10-CM

## 2019-03-12 RX ORDER — HYDROXYCHLOROQUINE SULFATE 200 MG/1
200 TABLET, FILM COATED ORAL 2 TIMES DAILY WITH MEALS
Qty: 60 TABLET | Refills: 5 | Status: SHIPPED | OUTPATIENT
Start: 2019-03-12 | End: 2019-09-03 | Stop reason: SDUPTHER

## 2019-03-12 NOTE — PROGRESS NOTES
Spoke with patient regarding treatment for RA  Will start Plaquenil 200 mg BID - reviewed side effects and discussed need for baseline eye exam   Also discussed with Dr Ervin Herman regarding Hep B treatment - Plaquenil will not interfere with this  If no improvement in 3-6 months will need to step up therapy and she will need to be seen by GI at that time for Hep B treatment  Patient understanding and agreeable to plan

## 2019-04-08 ENCOUNTER — TELEPHONE (OUTPATIENT)
Dept: GASTROENTEROLOGY | Facility: CLINIC | Age: 44
End: 2019-04-08

## 2019-05-14 LAB — HBV DNA SERPL NAA+PROBE-LOG IU: NORMAL {LOG_IU}/ML

## 2019-05-23 ENCOUNTER — OFFICE VISIT (OUTPATIENT)
Dept: RHEUMATOLOGY | Facility: CLINIC | Age: 44
End: 2019-05-23
Payer: COMMERCIAL

## 2019-05-23 VITALS
SYSTOLIC BLOOD PRESSURE: 108 MMHG | HEART RATE: 73 BPM | BODY MASS INDEX: 27.52 KG/M2 | DIASTOLIC BLOOD PRESSURE: 73 MMHG | HEIGHT: 68 IN | WEIGHT: 181.6 LBS

## 2019-05-23 DIAGNOSIS — B18.1 CHRONIC HEPATITIS B (HCC): ICD-10-CM

## 2019-05-23 DIAGNOSIS — M05.9 SEROPOSITIVE RHEUMATOID ARTHRITIS (HCC): Primary | ICD-10-CM

## 2019-05-23 PROCEDURE — 99213 OFFICE O/P EST LOW 20 MIN: CPT | Performed by: INTERNAL MEDICINE

## 2019-06-25 ENCOUNTER — OFFICE VISIT (OUTPATIENT)
Dept: GASTROENTEROLOGY | Facility: AMBULARY SURGERY CENTER | Age: 44
End: 2019-06-25
Payer: COMMERCIAL

## 2019-06-25 ENCOUNTER — APPOINTMENT (OUTPATIENT)
Dept: LAB | Facility: AMBULARY SURGERY CENTER | Age: 44
End: 2019-06-25
Payer: COMMERCIAL

## 2019-06-25 VITALS
RESPIRATION RATE: 16 BRPM | DIASTOLIC BLOOD PRESSURE: 72 MMHG | SYSTOLIC BLOOD PRESSURE: 114 MMHG | TEMPERATURE: 98.2 F | HEART RATE: 70 BPM | HEIGHT: 68 IN | BODY MASS INDEX: 27.28 KG/M2 | WEIGHT: 180 LBS

## 2019-06-25 DIAGNOSIS — B19.10 HEPATITIS B INFECTION WITHOUT DELTA AGENT WITHOUT HEPATIC COMA, UNSPECIFIED CHRONICITY: ICD-10-CM

## 2019-06-25 DIAGNOSIS — R76.8 HEPATITIS B SURFACE ANTIGEN POSITIVE: Primary | ICD-10-CM

## 2019-06-25 LAB
ALBUMIN SERPL BCP-MCNC: 4.1 G/DL (ref 3.5–5)
ALP SERPL-CCNC: 77 U/L (ref 46–116)
ALT SERPL W P-5'-P-CCNC: 36 U/L (ref 12–78)
ANION GAP SERPL CALCULATED.3IONS-SCNC: 5 MMOL/L (ref 4–13)
AST SERPL W P-5'-P-CCNC: 17 U/L (ref 5–45)
BILIRUB SERPL-MCNC: 0.4 MG/DL (ref 0.2–1)
BUN SERPL-MCNC: 12 MG/DL (ref 5–25)
CALCIUM SERPL-MCNC: 9.4 MG/DL (ref 8.3–10.1)
CHLORIDE SERPL-SCNC: 108 MMOL/L (ref 100–108)
CO2 SERPL-SCNC: 25 MMOL/L (ref 21–32)
CREAT SERPL-MCNC: 0.78 MG/DL (ref 0.6–1.3)
GFR SERPL CREATININE-BSD FRML MDRD: 93 ML/MIN/1.73SQ M
GLUCOSE SERPL-MCNC: 87 MG/DL (ref 65–140)
POTASSIUM SERPL-SCNC: 4.2 MMOL/L (ref 3.5–5.3)
PROT SERPL-MCNC: 7.5 G/DL (ref 6.4–8.2)
SODIUM SERPL-SCNC: 138 MMOL/L (ref 136–145)

## 2019-06-25 PROCEDURE — 99213 OFFICE O/P EST LOW 20 MIN: CPT | Performed by: PHYSICIAN ASSISTANT

## 2019-06-25 PROCEDURE — 36415 COLL VENOUS BLD VENIPUNCTURE: CPT

## 2019-06-25 PROCEDURE — 80053 COMPREHEN METABOLIC PANEL: CPT

## 2019-06-25 PROCEDURE — 87517 HEPATITIS B DNA QUANT: CPT

## 2019-06-27 LAB
HBV DNA SERPL NAA+PROBE-ACNC: NORMAL IU/ML
HBV DNA SERPL NAA+PROBE-LOG IU: 4.34 LOG10 IU/ML
REF LAB TEST REF RANGE: NORMAL

## 2019-07-03 DIAGNOSIS — B19.10 HEPATITIS B INFECTION WITHOUT DELTA AGENT WITHOUT HEPATIC COMA, UNSPECIFIED CHRONICITY: Primary | ICD-10-CM

## 2019-07-03 DIAGNOSIS — R76.8 HEPATITIS B SURFACE ANTIGEN POSITIVE: ICD-10-CM

## 2019-07-03 NOTE — PROGRESS NOTES
Scripts mailed to patient to recheck hep b viral load and CMP in 3 months  She will advise if medication to treat her RA is changed from current regimen

## 2019-09-03 ENCOUNTER — OFFICE VISIT (OUTPATIENT)
Dept: RHEUMATOLOGY | Facility: CLINIC | Age: 44
End: 2019-09-03
Payer: COMMERCIAL

## 2019-09-03 ENCOUNTER — TELEPHONE (OUTPATIENT)
Dept: GASTROENTEROLOGY | Facility: CLINIC | Age: 44
End: 2019-09-03

## 2019-09-03 VITALS
DIASTOLIC BLOOD PRESSURE: 84 MMHG | BODY MASS INDEX: 27.28 KG/M2 | SYSTOLIC BLOOD PRESSURE: 118 MMHG | HEART RATE: 84 BPM | WEIGHT: 179.4 LBS

## 2019-09-03 DIAGNOSIS — Z79.899 LONG-TERM USE OF PLAQUENIL: ICD-10-CM

## 2019-09-03 DIAGNOSIS — M19.041 PRIMARY OSTEOARTHRITIS OF BOTH HANDS: ICD-10-CM

## 2019-09-03 DIAGNOSIS — M19.042 PRIMARY OSTEOARTHRITIS OF BOTH HANDS: ICD-10-CM

## 2019-09-03 DIAGNOSIS — M05.79 RHEUMATOID ARTHRITIS INVOLVING MULTIPLE SITES WITH POSITIVE RHEUMATOID FACTOR (HCC): ICD-10-CM

## 2019-09-03 DIAGNOSIS — B18.1 CHRONIC HEPATITIS B (HCC): ICD-10-CM

## 2019-09-03 DIAGNOSIS — M05.9 SEROPOSITIVE RHEUMATOID ARTHRITIS (HCC): Primary | ICD-10-CM

## 2019-09-03 PROCEDURE — 99214 OFFICE O/P EST MOD 30 MIN: CPT | Performed by: INTERNAL MEDICINE

## 2019-09-03 RX ORDER — HYDROXYCHLOROQUINE SULFATE 200 MG/1
200 TABLET, FILM COATED ORAL 2 TIMES DAILY WITH MEALS
Qty: 60 TABLET | Refills: 5 | Status: SHIPPED | OUTPATIENT
Start: 2019-09-03 | End: 2020-01-29 | Stop reason: SDUPTHER

## 2019-09-03 NOTE — PROGRESS NOTES
Assessment and Plan:   Ms Benson Darby is a 49-year-old female with history significant for seropositive rheumatoid arthritis and positive hepatitis-B surface antigen, who presents for follow-up  She is currently on hydroxychloroquine 200 mg twice daily      # Seropositive rheumatoid arthritis  - Stacy Soto presents today for follow-up of seropositive rheumatoid arthritis and is currently on hydroxychloroquine 200 mg twice daily  While she was undergoing screening tests for initiation of DMARD therapy, she was found to have a positive hepatitis B surface antigen and elevated HBV DNA viral load  In view of this we opted to start her on hydroxychloroquine  - She continues to report arthralgias most prominently affecting her distal interphalangeal joints, and denies any specific pain at the PIPs or MCPs  She states with the hydroxychloroquine the pain affecting her bilateral wrists has also improved  There are no other significant arthralgias noted  She does use the topical diclofenac gel as needed for her bilateral hands which does help  Based on my examination today, I do not appreciate any synovitis at the small joints of her hands, and the wrist swelling has also improved  In view of this I suspect the hydroxychloroquine is exerting an anti-inflammatory effect and controlling the rheumatoid arthritis, but most likely she is symptomatic from underlying osteoarthritis  - I did discuss the difference between the osteoarthritis and rheumatoid arthritis with her in depth, and advised her for continued treatment of the rheumatoid arthritis I would recommend the hydroxychloroquine 200 mg twice daily, as there is no indication to currently step up DMARD therapy    For the osteoarthritis she can continue with conservative measures such as oral NSAIDs as needed, or even application of the topical diclofenac gel daily      - I advised her to continue monitoring for worsening joint pains or additional joint involvement which may indicate the need to step up DMARD therapy  Prior to doing so she will need to follow up with Gastroenterology to be started on antiviral therapy for the chronic hepatitis-B  For now I will see her back in the office in 4 months, but advised her to call with any concerns or flare-up in symptoms        Plan:  Diagnoses and all orders for this visit:    Seropositive rheumatoid arthritis ([facility] [address] )    Chronic hepatitis B ([facility] [address] )    Primary osteoarthritis of both hands    Long-term use of Plaquenil    Rheumatoid arthritis involving multiple sites with positive rheumatoid factor (HCC)  -     hydroxychloroquine (PLAQUENIL) 200 mg tablet; Take 1 tablet (200 mg total) by mouth 2 (two) times a day with meals for 180 days      Activities as tolerated    Diet: low carb/low fat, more greens/vegetables, adequate hydration  Exercise: try to maintain a low impact exercise regimen as much as possible  Walk for 30 minutes a day for at least 3 days a week    Encouraged to maintain good sleep hygiene  Continue other medications as prescribed by PCP and other specialists        RTC in 4 months  HPI    INITIAL VISIT NOTE:  Ms Fox is a 42-year-old female with no significant past medical history, who presents for further evaluation of diffuse joint pains and a positive rheumatoid factor at a titer of 80   She is referred by Dr Yvonne Fung      Patient reports she has been experiencing diffuse joint pains over the past 2-3 years, but states in the past 1 year she has noticed worsening pain affecting her bilateral hands and feet   She says the symptoms have been gradually progressive over time, and affect her on a daily basis in a mostly constant manner   She does also intermittently experience pain at her wrists, shoulders, hips, knees and ankles, but these joint pains affect her more intermittently   She has noticed swelling affecting her bilateral hands and wrists, and does present with swelling of her left wrist today   She also experiences morning stiffness which affects her diffusely, including her hands, and states it lasts at least 1 hour but on occasion can persist throughout the day   She has not tried taking any over-the-counter pain medications  Gabi Gutierres has custody of her 3 grand children, and states repetitive hand movements required while caring for them (such as buttoning) really aggravates her symptoms  She was seen by her primary care physician in view of these complaints and had additional testing done which revealed a positive rheumatoid factor at a titer of 80  CBC, CMP, DINA screen, Lyme antibody profile, ESR and TSH were unremarkable   She has not had any hand x-rays done      She also reports a history of dry skin affecting her palms, but states this has currently resolved with the use of moisturizers   She otherwise denies any other skin rash or history of psoriasis   No family history of autoimmune disease         5/23/2019:  Patient presents for follow-up of seropositive rheumatoid arthritis  She is currently on hydroxychloroquine 200 mg twice daily that was started in March 2019      She continues to report pain predominantly affecting her hands, feet, back and knees  She states the pain is most prominent in the morning but resolves during the day  She states the wrist swelling has resolved  On occasion she will still notice swelling affecting her fingers  She has also been noticing over the past few weeks an intense pain associated with swelling affecting the dorsum of her right foot, which will cause a spasm of her toes  This occurs and remits spontaneously  There is otherwise no joint swelling noted  She experiences morning stiffness affecting the same joints which lasts 1-2 hours  She is currently not taking any over-the-counter pain medications    She has been tolerating the hydroxychloroquine well      She was seen by Gastroenterology following our prior office visit in view of the positive hepatitis B surface antigen  She was also found to have a positive viral load  Due to normal LFTs, and patient being asymptomatic, there was no indication to start her on antivirals unless there are plans for immunosuppressive therapy for the rheumatoid arthritis  In view of this she was started on hydroxychloroquine as opposed to other DMARDs  She will continue follow-up with Gastroenterology        9/3/2019:  Patient presents for a follow-up of seropositive rheumatoid arthritis  She is currently on hydroxychloroquine 200 mg twice daily  She reports with the hydroxychloroquine there has been an improvement in the bilateral wrist pain, and only on occasion will she experience pain going through her fingers  Her main complaint at this time is pain predominantly affecting her bilateral thumbs and index fingers at the IP and DIP joints, respectively  She is denying any other significant joint pains, except for ongoing low back pain  She notices occasional swelling affecting her fingers  She does experience morning stiffness which mostly affects her hands and lasts about 30 minutes  On occasion she will apply the diclofenac gel which does help her  She has otherwise been tolerating the hydroxychloroquine well, and it seems like she is up-to-date with her eye exams  No other complaints noted at this time  Of note she is continuing to follow-up with gastroenterology for the positive hepatitis B viral load, but there are no plans to start her on treatment unless we advance with rheumatoid arthritis treatment  The following portions of the patient's history were reviewed and updated as appropriate: allergies, current medications, past family history, past medical history, past social history, past surgical history and problem list       Review of Systems  Constitutional: Negative for weight change, fevers, chills, night sweats, fatigue    ENT/Mouth: Negative for hearing changes, ear pain, nasal congestion, sinus pain, hoarseness, sore throat, rhinorrhea, swallowing difficulty  Eyes: Negative for pain, redness, discharge, vision changes  Cardiovascular: Negative for chest pain, SOB, palpitations  Respiratory: Negative for cough, sputum, wheezing, dyspnea  Gastrointestinal: Negative for nausea, vomiting, diarrhea, constipation, pain, heartburn  Genitourinary: Negative for dysuria, urinary frequency, hematuria  Musculoskeletal: As per HPI  Skin: Negative for skin rash, color changes  Neuro: Negative for weakness, numbness, tingling, loss of consciousness  Psych: Negative for anxiety, depression  Heme/Lymph: Negative for easy bruising, bleeding, lymphadenopathy  Past Medical History:   Diagnosis Date    Disease of thyroid gland        Past Surgical History:   Procedure Laterality Date    HYSTERECTOMY      TOTAL ABDOMINAL HYSTERECTOMY W/ BILATERAL SALPINGOOPHORECTOMY      TUBAL LIGATION         Social History     Socioeconomic History    Marital status: /Civil Union     Spouse name: Not on file    Number of children: 3    Years of education: Not on file    Highest education level: Not on file   Occupational History     Employer: [employer]   Social Needs    Financial resource strain: Not on file    Food insecurity:     Worry: Not on file     Inability: Not on file    Transportation needs:     Medical: Not on file     Non-medical: Not on file   Tobacco Use    Smoking status: Current Every Day Smoker     Packs/day: 1 00     Types: Cigarettes    Smokeless tobacco: Never Used   Substance and Sexual Activity    Alcohol use: No     Comment: Per [employer];  Social alcohol use    Drug use: No    Sexual activity: Yes   Lifestyle    Physical activity:     Days per week: Not on file     Minutes per session: Not on file    Stress: Not on file   Relationships    Social connections:     Talks on phone: Not on file     Gets together: Not on file     Attends Rastafarian service: Not on file     Active member of club or organization: Not on file     Attends meetings of clubs or organizations: Not on file     Relationship status: Not on file    Intimate partner violence:     Fear of current or ex partner: Not on file     Emotionally abused: Not on file     Physically abused: Not on file     Forced sexual activity: Not on file   Other Topics Concern    Not on file   Social History Narrative    Daily coffee consumption    Uses seatbelts       Family History   Problem Relation Age of Onset    Ovarian cancer Mother     Pancreatic cancer Maternal Grandfather     Thyroid cancer Maternal Aunt     Liver cancer Maternal Uncle     Pancreatic cancer Maternal Uncle        Allergies   Allergen Reactions    Penicillins        Current Outpatient Medications:     diclofenac sodium (VOLTAREN) 1 %, Apply 2 g topically 3 (three) times a day as needed (Hand pain), Disp: 1 Tube, Rfl: 2    hydroxychloroquine (PLAQUENIL) 200 mg tablet, Take 1 tablet (200 mg total) by mouth 2 (two) times a day with meals for 180 days, Disp: 60 tablet, Rfl: 5      Objective:    Vitals:    09/03/19 1144   BP: 118/84   BP Location: Left arm   Patient Position: Sitting   Cuff Size: Adult   Pulse: 84   Weight: 81 4 kg (179 lb 6 4 oz)       Physical Exam  General: Well appearing, well nourished, in no distress  Oriented x 3, normal mood and affect  Ambulating without difficulty  Skin: Good turgor, no rash, unusual bruising or prominent lesions  Hair: Normal texture and distribution  Nails: Normal color, no deformities  HEENT:  Head: Normocephalic, atraumatic  Eyes: Conjunctiva clear, sclera non-icteric, EOM intact  Nose: No external lesions, mucosa non-inflamed  Mouth: Mucous membranes moist, no mucosal lesions  Neck: Supple, thyroid non-enlarged and non-tender  No lymphadenopathy  Extremities: No amputations or deformities, cyanosis, edema    Musculoskeletal:   Hands - there is no soft tissue swelling of her bilateral DIPs, PIPs or MCPs  There is no significant tenderness to palpation of these joints either  She is able to make full fists bilaterally  Wrists - there is mild medial soft tissue swelling present at the right wrist, but there is no tenderness or restriction in range of motion  Her left wrist is unremarkable  Elbows and shoulders - unremarkable  Hips, knees, ankles and feet - unremarkable  Neurologic: Alert and oriented  No focal neurological deficits appreciated  Psychiatric: Normal mood and affect  WISAM Tang    Rheumatology

## 2019-10-01 ENCOUNTER — OFFICE VISIT (OUTPATIENT)
Dept: URGENT CARE | Facility: MEDICAL CENTER | Age: 44
End: 2019-10-01
Payer: COMMERCIAL

## 2019-10-01 VITALS
SYSTOLIC BLOOD PRESSURE: 118 MMHG | RESPIRATION RATE: 18 BRPM | WEIGHT: 178 LBS | HEART RATE: 84 BPM | OXYGEN SATURATION: 100 % | HEIGHT: 68 IN | DIASTOLIC BLOOD PRESSURE: 60 MMHG | TEMPERATURE: 97.6 F | BODY MASS INDEX: 26.98 KG/M2

## 2019-10-01 DIAGNOSIS — M79.671 RIGHT FOOT PAIN: Primary | ICD-10-CM

## 2019-10-01 PROCEDURE — 99213 OFFICE O/P EST LOW 20 MIN: CPT | Performed by: PHYSICIAN ASSISTANT

## 2019-10-01 NOTE — PATIENT INSTRUCTIONS
Right foot pain  Rest, ice, elevate  Over the counter ibuprofen as needed   Follow up with PCP in 3-5 days  Proceed to  ER if symptoms worsen  Place foot pain patient instructions here

## 2019-10-01 NOTE — PROGRESS NOTES
[address] [facility]        NAME: Cherilynn Brittle is a 40 y o  female  : 1975    MRN: [id number]  DATE: 2019  TIME: 8:49 AM    Assessment and Plan   Right foot pain [M79 [id number]]  1  Right foot pain           Patient Instructions     Right foot pain  Rest, ice, elevate  Over the counter ibuprofen as needed   Follow up with PCP in 3-5 days  Proceed to  ER if symptoms worsen  Chief Complaint     Chief Complaint   Patient presents with    Foot Pain     Started this morning at 4:00am with sharp pain on Rt foot,pt states it now feels sore, concerned with a possible blood clot (denies numbness/tingling or other sx)         History of Present Illness       41 y/o female presents c/o pain to right foot  Patient states she developed pain and swelling to side of foot which has now improved  Patient states she developed similar symptoms in the past  Denies trauma, fever, chills, chest pain, palpitations, SOB, calf pain      Review of Systems   Review of Systems   Constitutional: Negative  HENT: Negative  Eyes: Negative  Respiratory: Negative  Negative for cough, chest tightness, shortness of breath, wheezing and stridor  Cardiovascular: Negative  Negative for chest pain, palpitations and leg swelling  Musculoskeletal: Positive for arthralgias           Current Medications       Current Outpatient Medications:     hydroxychloroquine (PLAQUENIL) 200 mg tablet, Take 1 tablet (200 mg total) by mouth 2 (two) times a day with meals for 180 days, Disp: 60 tablet, Rfl: 5    diclofenac sodium (VOLTAREN) 1 %, Apply 2 g topically 3 (three) times a day as needed (Hand pain) (Patient not taking: Reported on 10/1/2019), Disp: 1 Tube, Rfl: 2    Current Allergies     Allergies as of 10/01/2019 - Reviewed 10/01/2019   Allergen Reaction Noted    Penicillins  2014            The following portions of the patient's history were reviewed and updated as appropriate: allergies, current medications, past family history, past medical history, past social history, past surgical history and problem list      Past Medical History:   Diagnosis Date    Disease of thyroid gland        Past Surgical History:   Procedure Laterality Date    HYSTERECTOMY      TOTAL ABDOMINAL HYSTERECTOMY W/ BILATERAL SALPINGOOPHORECTOMY      TUBAL LIGATION         Family History   Problem Relation Age of Onset    Ovarian cancer Mother     Pancreatic cancer Maternal Grandfather     Thyroid cancer Maternal Aunt     Liver cancer Maternal Uncle     Pancreatic cancer Maternal Uncle          Medications have been verified  Objective   /60   Pulse 84   Temp 97 6 °F (36 4 °C) (Temporal)   Resp 18   Ht 5' 8" (1 727 m)   Wt 80 7 kg (178 lb)   SpO2 100%   BMI 27 06 kg/m²        Physical Exam     Physical Exam   Constitutional: She appears well-developed and well-nourished  HENT:   Head: Normocephalic and atraumatic  Neck: Normal range of motion  Neck supple  Cardiovascular: Normal rate, regular rhythm, normal heart sounds and intact distal pulses  Pulmonary/Chest: Effort normal and breath sounds normal  No stridor  No respiratory distress  She has no wheezes  She has no rales  She exhibits no tenderness  Musculoskeletal:        Right foot: There is tenderness  There is normal range of motion, no bony tenderness, no swelling, normal capillary refill, no crepitus, no deformity and no laceration  Feet:    Lymphadenopathy:     She has no cervical adenopathy       Patient declined xrays at this time

## 2019-10-02 ENCOUNTER — TRANSCRIBE ORDERS (OUTPATIENT)
Dept: ADMINISTRATIVE | Facility: HOSPITAL | Age: 44
End: 2019-10-02

## 2019-10-02 DIAGNOSIS — M79.671 PAIN IN BOTH FEET: Primary | ICD-10-CM

## 2019-10-02 DIAGNOSIS — M79.672 PAIN IN BOTH FEET: Primary | ICD-10-CM

## 2019-10-09 ENCOUNTER — HOSPITAL ENCOUNTER (OUTPATIENT)
Dept: NON INVASIVE DIAGNOSTICS | Facility: CLINIC | Age: 44
Discharge: HOME | End: 2019-10-09
Payer: COMMERCIAL

## 2019-10-09 DIAGNOSIS — M79.672 PAIN IN BOTH FEET: ICD-10-CM

## 2019-10-09 DIAGNOSIS — M79.671 PAIN IN BOTH FEET: ICD-10-CM

## 2019-10-09 PROCEDURE — 93970 EXTREMITY STUDY: CPT

## 2019-10-09 PROCEDURE — 93970 EXTREMITY STUDY: CPT | Performed by: SURGERY

## 2020-01-28 NOTE — PROGRESS NOTES
Assessment and Plan:   Ms Boogie Baker a 70-year-old female with history significant for seropositive rheumatoid arthritis and positive hepatitis-B surface antigen, who presents for follow-up   She is currently on hydroxychloroquine 200 mg twice daily      # Seropositive rheumatoid arthritis  - Frandy presents today for follow-up of seropositive rheumatoid arthritis and is currently on hydroxychloroquine 200 mg twice daily   While she was undergoing screening tests for initiation of DMARD therapy, she was found to have a positive hepatitis B surface antigen and elevated HBV DNA viral load   In view of this we opted to start her on hydroxychloroquine        - She reports overall she has been doing well on the hydroxychloroquine, and is denying any significant arthralgias  Her physical examination is also unrevealing today  I feel like overall her rheumatoid arthritis is in remission  In view of this we will continue with the hydroxychloroquine 200 mg twice daily, and I advised her to follow up for annual eye exams  At this time there is no indication to step up DMARDs  She can take Tylenol or NSAIDs as needed      - I advised her to continue monitoring for worsening joint pains or additional joint involvement which may indicate the need to step up DMARD therapy  Prior to doing so she will need to follow up with Gastroenterology to be started on antiviral therapy for the chronic hepatitis-B  For now I will see her back in the office in 6 months, but advised her to call with any concerns or flare-up in symptoms  Plan:  Diagnoses and all orders for this visit:    Seropositive rheumatoid arthritis ([facility] [address] )    Chronic hepatitis B ([facility] [address] )    Primary osteoarthritis of both hands    Long-term use of Plaquenil    Rheumatoid arthritis involving multiple sites with positive rheumatoid factor (HCC)  -     hydroxychloroquine (PLAQUENIL) 200 mg tablet;  Take 1 tablet (200 mg total) by mouth 2 (two) times a day with meals      Activities as tolerated    Diet: low carb/low fat, more greens/vegetables, adequate hydration  Exercise: try to maintain a low impact exercise regimen as much as possible  Walk for 30 minutes a day for at least 3 days a week    Encouraged to maintain good sleep hygiene  Continue other medications as prescribed by PCP and other specialists        RTC in 6 months  HPI    INITIAL VISIT NOTE:  Ms Fox is a 35-year-old female with no significant past medical history, who presents for further evaluation of diffuse joint pains and a positive rheumatoid factor at a titer of 80   She is referred by Dr Kilgore Heads      Patient reports she has been experiencing diffuse joint pains over the past 2-3 years, but states in the past 1 year she has noticed worsening pain affecting her bilateral hands and feet   She says the symptoms have been gradually progressive over time, and affect her on a daily basis in a mostly constant manner   She does also intermittently experience pain at her wrists, shoulders, hips, knees and ankles, but these joint pains affect her more intermittently   She has noticed swelling affecting her bilateral hands and wrists, and does present with swelling of her left wrist today   She also experiences morning stiffness which affects her diffusely, including her hands, and states it lasts at least 1 hour but on occasion can persist throughout the day   She has not tried taking any over-the-counter pain medications  Carla Gonzalez has custody of her 3 grand children, and states repetitive hand movements required while caring for them (such as buttoning) really aggravates her symptoms  She was seen by her primary care physician in view of these complaints and had additional testing done which revealed a positive rheumatoid factor at a titer of 80   CBC, CMP, DINA screen, Lyme antibody profile, ESR and TSH were unremarkable   She has not had any hand x-rays done      She also reports a history of dry skin affecting her palms, but states this has currently resolved with the use of moisturizers   She otherwise denies any other skin rash or history of psoriasis   No family history of autoimmune disease         5/23/2019:  Patient presents for follow-up of seropositive rheumatoid arthritis  Deb Young is currently on hydroxychloroquine 200 mg twice daily that was started in March 2019      She continues to report pain predominantly affecting her hands, feet, back and knees   She states the pain is most prominent in the morning but resolves during the day   She states the wrist swelling has resolved   On occasion she will still notice swelling affecting her fingers   She has also been noticing over the past few weeks an intense pain associated with swelling affecting the dorsum of her right foot, which will cause a spasm of her toes   This occurs and remits spontaneously  Jacob Singletary is otherwise no joint swelling noted   She experiences morning stiffness affecting the same joints which lasts 1-2 hours  Deb Young is currently not taking any over-the-counter pain medications   She has been tolerating the hydroxychloroquine well      She was seen by Gastroenterology following our prior office visit in view of the positive hepatitis B surface antigen  Deb Young was also found to have a positive viral load   Due to normal LFTs, and patient being asymptomatic, there was no indication to start her on antivirals unless there are plans for immunosuppressive therapy for the rheumatoid arthritis   In view of this she was started on hydroxychloroquine as opposed to other DMARDs  Deb Young will continue follow-up with Gastroenterology         9/3/2019:  Patient presents for a follow-up of seropositive rheumatoid arthritis  She is currently on hydroxychloroquine 200 mg twice daily      She reports with the hydroxychloroquine there has been an improvement in the bilateral wrist pain, and only on occasion will she experience pain going through her fingers  Her main complaint at this time is pain predominantly affecting her bilateral thumbs and index fingers at the IP and DIP joints, respectively  She is denying any other significant joint pains, except for ongoing low back pain  She notices occasional swelling affecting her fingers  She does experience morning stiffness which mostly affects her hands and lasts about 30 minutes  On occasion she will apply the diclofenac gel which does help her      She has otherwise been tolerating the hydroxychloroquine well, and it seems like she is up-to-date with her eye exams  No other complaints noted at this time      Of note she is continuing to follow-up with gastroenterology for the positive hepatitis B viral load, but there are no plans to start her on treatment unless we advance with rheumatoid arthritis treatment  1/29/2020:  Patient presents for a follow-up of seropositive rheumatoid arthritis  She is currently on hydroxychloroquine 200 mg twice daily  She reports overall she is doing well  She will still on occasion experience pain affecting her left hand PIP joints, as well as her shoulders and low back region  She denies any significant pain of her right hand, wrists, elbows, hips, knees, ankles or feet  She denies any swollen joints  She does experience morning stiffness which can affect her hands but improves within about 20 minutes  She is not taking any Tylenol or over-the-counter NSAIDs for her symptoms  She has been tolerating the hydroxychloroquine well without any concerns for side effects  She will be due for an annual eye exam later this year  She continues to follow-up with Gastroenterology periodically for the chronic hepatitis B infection      The following portions of the patient's history were reviewed and updated as appropriate: allergies, current medications, past family history, past medical history, past social history, past surgical history and problem list       Review of Systems  Constitutional: Negative for weight change, fevers, chills, night sweats, fatigue  ENT/Mouth: Negative for hearing changes, ear pain, nasal congestion, sinus pain, hoarseness, sore throat, rhinorrhea, swallowing difficulty  Eyes: Negative for pain, redness, discharge, vision changes  Cardiovascular: Negative for chest pain, SOB, palpitations  Respiratory: Negative for cough, sputum, wheezing, dyspnea  Gastrointestinal: Negative for nausea, vomiting, diarrhea, constipation, pain, heartburn  Genitourinary: Negative for dysuria, urinary frequency, hematuria  Musculoskeletal: As per HPI  Skin: Negative for skin rash, color changes  Neuro: Negative for weakness, numbness, tingling, loss of consciousness  Psych: Negative for anxiety, depression  Heme/Lymph: Negative for easy bruising, bleeding, lymphadenopathy  Past Medical History:   Diagnosis Date    Disease of thyroid gland        Past Surgical History:   Procedure Laterality Date    HYSTERECTOMY      TOTAL ABDOMINAL HYSTERECTOMY W/ BILATERAL SALPINGOOPHORECTOMY      TUBAL LIGATION         Social History     Socioeconomic History    Marital status: /Civil Union     Spouse name: Not on file    Number of children: 3    Years of education: Not on file    Highest education level: Not on file   Occupational History     Employer: [employer]   Social Needs    Financial resource strain: Not on file    Food insecurity:     Worry: Not on file     Inability: Not on file    Transportation needs:     Medical: Not on file     Non-medical: Not on file   Tobacco Use    Smoking status: Current Every Day Smoker     Packs/day: 1 00     Types: Cigarettes    Smokeless tobacco: Never Used   Substance and Sexual Activity    Alcohol use: No     Comment: Per [employer];  Social alcohol use    Drug use: No    Sexual activity: Yes   Lifestyle    Physical activity:     Days per week: Not on file     Minutes per session: Not on file  Stress: Not on file   Relationships    Social connections:     Talks on phone: Not on file     Gets together: Not on file     Attends Yazidism service: Not on file     Active member of club or organization: Not on file     Attends meetings of clubs or organizations: Not on file     Relationship status: Not on file    Intimate partner violence:     Fear of current or ex partner: Not on file     Emotionally abused: Not on file     Physically abused: Not on file     Forced sexual activity: Not on file   Other Topics Concern    Not on file   Social History Narrative    Daily coffee consumption    Uses seatbelts       Family History   Problem Relation Age of Onset    Ovarian cancer Mother     Pancreatic cancer Maternal Grandfather     Thyroid cancer Maternal Aunt     Liver cancer Maternal Uncle     Pancreatic cancer Maternal Uncle        Allergies   Allergen Reactions    Penicillins        Current Outpatient Medications:     diclofenac sodium (VOLTAREN) 1 %, Apply 2 g topically 3 (three) times a day as needed (Hand pain) (Patient not taking: Reported on 10/1/2019), Disp: 1 Tube, Rfl: 2    hydroxychloroquine (PLAQUENIL) 200 mg tablet, Take 1 tablet (200 mg total) by mouth 2 (two) times a day with meals, Disp: 60 tablet, Rfl: 5      Objective:    Vitals:    01/29/20 1042   BP: 122/84   BP Location: Left arm   Patient Position: Sitting   Cuff Size: Adult   Pulse: 82   Height: 5' 8" (1 727 m)       Physical Exam  General: Well appearing, well nourished, in no distress  Oriented x 3, normal mood and affect  Ambulating without difficulty  Skin: Good turgor, no rash, unusual bruising or prominent lesions  Hair: Normal texture and distribution  Nails: Normal color, no deformities  HEENT:  Head: Normocephalic, atraumatic  Eyes: Conjunctiva clear, sclera non-icteric, EOM intact  Nose: No external lesions, mucosa non-inflamed  Mouth: Mucous membranes moist, no mucosal lesions  Neck: Supple    Extremities: No amputations or deformities, cyanosis, edema  Musculoskeletal:  There is no peripheral joint soft tissue swelling noted  She has mild tenderness to palpation of her left hand 4th PIP joint  Neurologic: Alert and oriented  No focal neurological deficits appreciated  Psychiatric: Normal mood and affect  WISAM Craig    Rheumatology

## 2020-01-29 ENCOUNTER — OFFICE VISIT (OUTPATIENT)
Dept: RHEUMATOLOGY | Facility: CLINIC | Age: 45
End: 2020-01-29
Payer: COMMERCIAL

## 2020-01-29 ENCOUNTER — TRANSCRIBE ORDERS (OUTPATIENT)
Dept: ADMINISTRATIVE | Facility: HOSPITAL | Age: 45
End: 2020-01-29

## 2020-01-29 ENCOUNTER — APPOINTMENT (OUTPATIENT)
Dept: LAB | Facility: MEDICAL CENTER | Age: 45
End: 2020-01-29
Payer: COMMERCIAL

## 2020-01-29 VITALS
HEIGHT: 68 IN | SYSTOLIC BLOOD PRESSURE: 122 MMHG | DIASTOLIC BLOOD PRESSURE: 84 MMHG | HEART RATE: 82 BPM | BODY MASS INDEX: 27.06 KG/M2

## 2020-01-29 DIAGNOSIS — B19.10 HEPATITIS B INFECTION WITHOUT DELTA AGENT WITHOUT HEPATIC COMA, UNSPECIFIED CHRONICITY: Primary | ICD-10-CM

## 2020-01-29 DIAGNOSIS — M19.041 PRIMARY OSTEOARTHRITIS OF BOTH HANDS: ICD-10-CM

## 2020-01-29 DIAGNOSIS — B19.10 HEPATITIS B INFECTION WITHOUT DELTA AGENT WITHOUT HEPATIC COMA, UNSPECIFIED CHRONICITY: ICD-10-CM

## 2020-01-29 DIAGNOSIS — Z79.899 LONG-TERM USE OF PLAQUENIL: ICD-10-CM

## 2020-01-29 DIAGNOSIS — R76.8 HEPATITIS B SURFACE ANTIGEN POSITIVE: ICD-10-CM

## 2020-01-29 DIAGNOSIS — M05.79 RHEUMATOID ARTHRITIS INVOLVING MULTIPLE SITES WITH POSITIVE RHEUMATOID FACTOR (HCC): ICD-10-CM

## 2020-01-29 DIAGNOSIS — M05.9 SEROPOSITIVE RHEUMATOID ARTHRITIS (HCC): Primary | ICD-10-CM

## 2020-01-29 DIAGNOSIS — M19.042 PRIMARY OSTEOARTHRITIS OF BOTH HANDS: ICD-10-CM

## 2020-01-29 DIAGNOSIS — B18.1 CHRONIC HEPATITIS B (HCC): ICD-10-CM

## 2020-01-29 LAB
ALBUMIN SERPL BCP-MCNC: 3.9 G/DL (ref 3.5–5)
ALP SERPL-CCNC: 88 U/L (ref 46–116)
ALT SERPL W P-5'-P-CCNC: 32 U/L (ref 12–78)
ANION GAP SERPL CALCULATED.3IONS-SCNC: 5 MMOL/L (ref 4–13)
AST SERPL W P-5'-P-CCNC: 13 U/L (ref 5–45)
BILIRUB SERPL-MCNC: 0.3 MG/DL (ref 0.2–1)
BUN SERPL-MCNC: 13 MG/DL (ref 5–25)
CALCIUM SERPL-MCNC: 9.5 MG/DL (ref 8.3–10.1)
CHLORIDE SERPL-SCNC: 109 MMOL/L (ref 100–108)
CO2 SERPL-SCNC: 28 MMOL/L (ref 21–32)
CREAT SERPL-MCNC: 0.68 MG/DL (ref 0.6–1.3)
GFR SERPL CREATININE-BSD FRML MDRD: 107 ML/MIN/1.73SQ M
GLUCOSE SERPL-MCNC: 74 MG/DL (ref 65–140)
POTASSIUM SERPL-SCNC: 3.8 MMOL/L (ref 3.5–5.3)
PROT SERPL-MCNC: 7.6 G/DL (ref 6.4–8.2)
SODIUM SERPL-SCNC: 142 MMOL/L (ref 136–145)

## 2020-01-29 PROCEDURE — 87517 HEPATITIS B DNA QUANT: CPT

## 2020-01-29 PROCEDURE — 99213 OFFICE O/P EST LOW 20 MIN: CPT | Performed by: INTERNAL MEDICINE

## 2020-01-29 PROCEDURE — 80053 COMPREHEN METABOLIC PANEL: CPT | Performed by: PHYSICIAN ASSISTANT

## 2020-01-29 PROCEDURE — 36415 COLL VENOUS BLD VENIPUNCTURE: CPT | Performed by: PHYSICIAN ASSISTANT

## 2020-01-29 RX ORDER — HYDROXYCHLOROQUINE SULFATE 200 MG/1
200 TABLET, FILM COATED ORAL 2 TIMES DAILY WITH MEALS
Qty: 60 TABLET | Refills: 5 | Status: SHIPPED | OUTPATIENT
Start: 2020-01-29 | End: 2020-07-29 | Stop reason: SDUPTHER

## 2020-01-31 LAB
HBV DNA SERPL NAA+PROBE-ACNC: 5720 IU/ML
HBV DNA SERPL NAA+PROBE-LOG IU: 3.76 LOG10 IU/ML
REF LAB TEST REF RANGE: NORMAL

## 2020-02-03 ENCOUNTER — TELEPHONE (OUTPATIENT)
Dept: GASTROENTEROLOGY | Facility: CLINIC | Age: 45
End: 2020-02-03

## 2020-02-03 NOTE — TELEPHONE ENCOUNTER
----- Message from Samson Hart PA-C sent at 2/3/2020  9:02 AM EST -----  Please let patient know her hepatitis B dna is 5720, lower from prior, this is good news, we will see her at follow up 2/24 to discuss further

## 2020-02-03 NOTE — TELEPHONE ENCOUNTER
Called and relayed results to patient she wants to know if she still needs to come in for the apt if her levels are fine and wont require treatment     Please advise thank you!

## 2020-02-19 ENCOUNTER — OFFICE VISIT (OUTPATIENT)
Dept: FAMILY MEDICINE CLINIC | Facility: CLINIC | Age: 45
End: 2020-02-19
Payer: COMMERCIAL

## 2020-02-19 VITALS
DIASTOLIC BLOOD PRESSURE: 70 MMHG | HEART RATE: 89 BPM | TEMPERATURE: 98.8 F | HEIGHT: 67 IN | OXYGEN SATURATION: 98 % | WEIGHT: 189 LBS | RESPIRATION RATE: 16 BRPM | BODY MASS INDEX: 29.66 KG/M2 | SYSTOLIC BLOOD PRESSURE: 100 MMHG

## 2020-02-19 DIAGNOSIS — Z00.00 ANNUAL PHYSICAL EXAM: Primary | ICD-10-CM

## 2020-02-19 DIAGNOSIS — Z12.39 SCREENING FOR BREAST CANCER: ICD-10-CM

## 2020-02-19 DIAGNOSIS — R53.83 FATIGUE, UNSPECIFIED TYPE: ICD-10-CM

## 2020-02-19 DIAGNOSIS — E66.9 CLASS 1 OBESITY WITH BODY MASS INDEX (BMI) OF 30.0 TO 30.9 IN ADULT, UNSPECIFIED OBESITY TYPE, UNSPECIFIED WHETHER SERIOUS COMORBIDITY PRESENT: ICD-10-CM

## 2020-02-19 DIAGNOSIS — M06.9 RHEUMATOID ARTHRITIS, INVOLVING UNSPECIFIED SITE, UNSPECIFIED RHEUMATOID FACTOR PRESENCE: ICD-10-CM

## 2020-02-19 DIAGNOSIS — E04.1 THYROID NODULE: ICD-10-CM

## 2020-02-19 DIAGNOSIS — H91.90 HEARING LOSS, UNSPECIFIED HEARING LOSS TYPE, UNSPECIFIED LATERALITY: ICD-10-CM

## 2020-02-19 PROCEDURE — 3008F BODY MASS INDEX DOCD: CPT | Performed by: INTERNAL MEDICINE

## 2020-02-19 PROCEDURE — 99214 OFFICE O/P EST MOD 30 MIN: CPT | Performed by: INTERNAL MEDICINE

## 2020-02-19 PROCEDURE — 92551 PURE TONE HEARING TEST AIR: CPT | Performed by: INTERNAL MEDICINE

## 2020-02-19 PROCEDURE — 99396 PREV VISIT EST AGE 40-64: CPT | Performed by: INTERNAL MEDICINE

## 2020-02-19 NOTE — PATIENT INSTRUCTIONS

## 2020-02-19 NOTE — PROGRESS NOTES
[facility]    NAME: Karyle Dessert  AGE: 40 y o  SEX: female  : 1975     DATE: 2020     Assessment and Plan:     Problem List Items Addressed This Visit     None      Visit Diagnoses     Screening for breast cancer    -  Primary    Annual physical exam        Rheumatoid arthritis, involving unspecified site, unspecified rheumatoid factor presence ([facility] [address] )              Immunizations and preventive care screenings were discussed with patient today  Appropriate education was printed on patient's after visit summary  Counseling:  · Dental Health: discussed importance of regular tooth brushing, flossing, and dental visits  BMI Counseling: Body mass index is 30 05 kg/m²  The BMI is above normal  Nutrition recommendations include decreasing portion sizes and limiting drinks that contain sugar  Exercise recommendations include exercising 3-5 times per week  No pharmacotherapy was ordered  Patient referred to PCP due to patient being overweight  No follow-ups on file  Chief Complaint:     Chief Complaint   Patient presents with    Annual Exam     no vision per pt      History of Present Illness:     Adult Annual Physical   Patient here for a comprehensive physical exam  The patient reports no problems  Diet and Physical Activity  · Diet/Nutrition: well balanced diet  · Exercise: 5-7 times a week on average  Depression Screening  PHQ-9 Depression Screening    PHQ-9:    Frequency of the following problems over the past two weeks:            General Health  · Sleep: gets 4-6 hours of sleep on average  · Hearing: significantly decreased - left  · Vision: no vision problems  · Dental: no dental visits for >1 year  /GYN Health  · Patient is: postmenopausal  · Last menstrual period: age 44 with susanna/bso  · Contraceptive method: none  Review of Systems:     Review of Systems   Constitutional: Negative  HENT: Negative  Respiratory: Negative  Cardiovascular: Negative         Past Medical History:     Past Medical History:   Diagnosis Date    Disease of thyroid gland       Past Surgical History:     Past Surgical History:   Procedure Laterality Date    HYSTERECTOMY      TOTAL ABDOMINAL HYSTERECTOMY W/ BILATERAL SALPINGOOPHORECTOMY      TUBAL LIGATION        Social History:        Social History     Socioeconomic History    Marital status: /Civil Union     Spouse name: None    Number of children: 3    Years of education: None    Highest education level: None   Occupational History     Employer: [employer]   Social Needs    Financial resource strain: None    Food insecurity:     Worry: None     Inability: None    Transportation needs:     Medical: None     Non-medical: None   Tobacco Use    Smoking status: Current Every Day Smoker     Packs/day: 1 00     Years: 20 00     Pack years: 20 00     Types: Cigarettes    Smokeless tobacco: Never Used   Substance and Sexual Activity    Alcohol use: Yes     Comment: rare    Drug use: No    Sexual activity: Yes   Lifestyle    Physical activity:     Days per week: None     Minutes per session: None    Stress: None   Relationships    Social connections:     Talks on phone: None     Gets together: None     Attends Church service: None     Active member of club or organization: None     Attends meetings of clubs or organizations: None     Relationship status: None    Intimate partner violence:     Fear of current or ex partner: None     Emotionally abused: None     Physically abused: None     Forced sexual activity: None   Other Topics Concern    None   Social History Narrative    Daily coffee consumption    Uses seatbelts      Family History:     Family History   Problem Relation Age of Onset    Ovarian cancer Mother     Pancreatic cancer Maternal Grandfather     Thyroid cancer Maternal Aunt     Liver cancer Maternal Uncle     Pancreatic cancer Maternal Uncle       Current Medications:     Current Outpatient Medications   Medication Sig Dispense Refill    diclofenac sodium (VOLTAREN) 1 % Apply 2 g topically 3 (three) times a day as needed (Hand pain) 1 Tube 2    hydroxychloroquine (PLAQUENIL) 200 mg tablet Take 1 tablet (200 mg total) by mouth 2 (two) times a day with meals 60 tablet 5     No current facility-administered medications for this visit  Allergies: Allergies   Allergen Reactions    Penicillins       Physical Exam:     /70 (BP Location: Left arm, Patient Position: Sitting, Cuff Size: Large)   Pulse 89   Temp 98 8 °F (37 1 °C) (Temporal)   Resp 16   Ht 5' 6 5" (1 689 m)   Wt 85 7 kg (189 lb)   SpO2 98%   BMI 30 05 kg/m²     Physical Exam   Constitutional: She appears well-developed and well-nourished  No distress  HENT:   Head: Normocephalic and atraumatic  Right Ear: External ear normal    Left Ear: External ear normal    Nose: Nose normal    Mouth/Throat: Oropharynx is clear and moist  No oropharyngeal exudate  Neck: Normal range of motion  Neck supple  No JVD present  No tracheal deviation present  No thyromegaly present  Cardiovascular: Normal rate, regular rhythm, normal heart sounds and intact distal pulses  Exam reveals no gallop and no friction rub  No murmur heard  Pulmonary/Chest: Effort normal  No respiratory distress  She has no wheezes  She has no rales  Abdominal: Soft  She exhibits no distension and no mass  There is no tenderness  There is no guarding  Lymphadenopathy:     She has no cervical adenopathy  Skin: She is not diaphoretic         DO Carroll Rodriguez

## 2020-02-26 NOTE — PROGRESS NOTES
Pt also carries diag of ra  She sees rheum  She states has thyroid nodule and due for u/s   +[place] and agrees to see audiology  Did poorly on hearing test     Assessment/Plan:         Diagnoses and all orders for this visit:    Annual physical exam    Screening for breast cancer  -     Mammo screening bilateral w 3d & cad; Future    Rheumatoid arthritis, involving unspecified site, unspecified rheumatoid factor presence (HCC)    Thyroid nodule  -     TSH, 3rd generation with Free T4 reflex; Future  -     US thyroid; Future    Hearing loss, unspecified hearing loss type, unspecified laterality  -     Cancel: Ambulatory referral to Physical Therapy; Future  -     Ambulatory referral to Audiology; Future    Class 1 obesity with body mass index (BMI) of 30 0 to 30 9 in adult, unspecified obesity type, unspecified whether serious comorbidity present  -     Lipid panel; Future    Fatigue, unspecified type  -     CBC; Future    Other orders  -     Cancel: Mammo screening bilateral w cad; Future          Subjective:      Patient ID: Isai Salmeron is a 40 y o  female  Pt carries diag of ra  Sees rheum  She states has thyroid nodule and is due for u/s  She is [place] and agrees to see audiology  She is a hept b ag carrier and follows with gi      The following portions of the patient's history were reviewed and updated as appropriate: She  has a past medical history of Disease of thyroid gland  She   Patient Active Problem List    Diagnosis Date Noted    Hepatitis B surface antigen positive 02/22/2019     She  has a past surgical history that includes Hysterectomy; Total abdominal hysterectomy w/ bilateral salpingoophorectomy; and Tubal ligation  Her family history includes Liver cancer in her maternal uncle; Ovarian cancer in her mother; Pancreatic cancer in her maternal grandfather and maternal uncle; Thyroid cancer in her maternal aunt  She  reports that she has been smoking cigarettes   She has a 20 00 pack-year smoking history  She has never used smokeless tobacco  She reports that she drinks alcohol  She reports that she does not use drugs  Current Outpatient Medications   Medication Sig Dispense Refill    diclofenac sodium (VOLTAREN) 1 % Apply 2 g topically 3 (three) times a day as needed (Hand pain) 1 Tube 2    hydroxychloroquine (PLAQUENIL) 200 mg tablet Take 1 tablet (200 mg total) by mouth 2 (two) times a day with meals 60 tablet 5     No current facility-administered medications for this visit  Current Outpatient Medications on File Prior to Visit   Medication Sig    diclofenac sodium (VOLTAREN) 1 % Apply 2 g topically 3 (three) times a day as needed (Hand pain)    hydroxychloroquine (PLAQUENIL) 200 mg tablet Take 1 tablet (200 mg total) by mouth 2 (two) times a day with meals     No current facility-administered medications on file prior to visit  She is allergic to penicillins       Review of Systems   Constitutional: Negative  HENT: Positive for hearing loss  Gastrointestinal: Negative for abdominal distention, abdominal pain, nausea and vomiting  Musculoskeletal: Positive for arthralgias  Negative for joint swelling  Objective:      /70 (BP Location: Left arm, Patient Position: Sitting, Cuff Size: Large)   Pulse 89   Temp 98 8 °F (37 1 °C) (Temporal)   Resp 16   Ht 5' 6 5" (1 689 m)   Wt 85 7 kg (189 lb)   SpO2 98%   BMI 30 05 kg/m²          Physical Exam   Constitutional: She appears well-developed and well-nourished  No distress  HENT:   Head: Normocephalic and atraumatic  Right Ear: External ear normal    Left Ear: External ear normal    Nose: Nose normal    Mouth/Throat: Oropharynx is clear and moist  No oropharyngeal exudate  Did poorly on her hearing screen   Neck: Normal range of motion  Neck supple  No thyromegaly present  Cardiovascular: Normal rate, regular rhythm, normal heart sounds and intact distal pulses  Exam reveals no friction rub     No murmur heard   Pulmonary/Chest: Effort normal and breath sounds normal  No respiratory distress  She has no wheezes  Abdominal: Soft  Bowel sounds are normal  She exhibits no distension and no mass  There is no tenderness  There is no guarding  Lymphadenopathy:     She has no cervical adenopathy  Skin: She is not diaphoretic

## 2020-02-27 ENCOUNTER — OFFICE VISIT (OUTPATIENT)
Dept: AUDIOLOGY | Age: 45
End: 2020-02-27
Payer: COMMERCIAL

## 2020-02-27 DIAGNOSIS — H90.3 SENSORINEURAL HEARING LOSS, BILATERAL: Primary | ICD-10-CM

## 2020-02-27 PROCEDURE — 92567 TYMPANOMETRY: CPT | Performed by: AUDIOLOGIST

## 2020-02-27 PROCEDURE — 92557 COMPREHENSIVE HEARING TEST: CPT | Performed by: AUDIOLOGIST

## 2020-02-27 NOTE — LETTER
2020     Lennox Dauer, PA-C  [address]    Patient: Melisa Davila   YOB: 1975   Date of Visit: 2020       Dear Dr Ainsley Arreaga:    Thank you for referring Lorna Pagan to me for evaluation  Below are my notes for this consultation  If you have questions, please do not hesitate to call me  I look forward to following your patient along with you  Sincerely,        Yasemin Ambrosio        CC: No Recipients  Yasemin Ambrosio  2020  3:11 PM  Sign at close encounter  HEARING EVALUATION    Name:  Melisa Davila  :  1975  Age:  40 y o  Date of Evaluation: 20     History: Tinnitus, failed screening  Reason for visit: Melisa Davila is being seen today at the request of Dr Ainsley Arreaga for an evaluation of hearing due to failed hearing screening  Patient reports constant non-lateralizing tinnitus  She denies ear pain or dizziness  EVALUATION:    Otoscopic Evaluation:   Right Ear: Clear and healthy ear canal and tympanic membrane   Left Ear: Clear and healthy ear canal and tympanic membrane    Tympanometry:   Right: Type A - normal middle ear pressure and compliance   Left: Type A - normal middle ear pressure and compliance    Audiogram Results:  Normal peripheral hearing sensitivity in each ear  *see attached audiogram      RECOMMENDATIONS:  Annual hearing eval, Return to [facility]  for F/U and Copy to Patient/Caregiver    PATIENT EDUCATION:   Discussed results and recommendations with patient  Questions were addressed and the patient was encouraged to contact our department should concerns arise        Josefina Perez   Clinical Audiologist

## 2020-02-27 NOTE — PROGRESS NOTES
HEARING EVALUATION    Name:  Reginald Diop  :  1975  Age:  40 y o  Date of Evaluation: 20     History: Tinnitus, failed screening  Reason for visit: Reginald Diop is being seen today at the request of Dr Sarah Damon for an evaluation of hearing due to failed hearing screening  Patient reports constant non-lateralizing tinnitus  She denies ear pain or dizziness  EVALUATION:    Otoscopic Evaluation:   Right Ear: Clear and healthy ear canal and tympanic membrane   Left Ear: Clear and healthy ear canal and tympanic membrane    Tympanometry:   Right: Type A - normal middle ear pressure and compliance   Left: Type A - normal middle ear pressure and compliance    Audiogram Results:  Normal peripheral hearing sensitivity in each ear  *see attached audiogram      RECOMMENDATIONS:  Annual hearing eval, Return to [facility]  for F/U and Copy to Patient/Caregiver    PATIENT EDUCATION:   Discussed results and recommendations with patient  Questions were addressed and the patient was encouraged to contact our department should concerns arise        Josefina Godfrey   Clinical Audiologist

## 2020-04-14 ENCOUNTER — HOSPITAL ENCOUNTER (OUTPATIENT)
Dept: RADIOLOGY | Facility: MEDICAL CENTER | Age: 45
Discharge: HOME | End: 2020-04-14

## 2020-05-21 ENCOUNTER — HOSPITAL ENCOUNTER (OUTPATIENT)
Dept: RADIOLOGY | Facility: MEDICAL CENTER | Age: 45
Discharge: HOME | End: 2020-05-21
Payer: COMMERCIAL

## 2020-05-21 VITALS — WEIGHT: 189 LBS | HEIGHT: 66 IN | BODY MASS INDEX: 30.37 KG/M2

## 2020-05-21 DIAGNOSIS — Z12.39 SCREENING FOR BREAST CANCER: ICD-10-CM

## 2020-05-21 DIAGNOSIS — Z12.31 VISIT FOR SCREENING MAMMOGRAM: ICD-10-CM

## 2020-05-21 PROCEDURE — 77067 SCR MAMMO BI INCL CAD: CPT

## 2020-05-21 PROCEDURE — 77063 BREAST TOMOSYNTHESIS BI: CPT

## 2020-05-26 ENCOUNTER — HOSPITAL ENCOUNTER (OUTPATIENT)
Dept: RADIOLOGY | Facility: MEDICAL CENTER | Age: 45
Discharge: HOME | End: 2020-05-26
Payer: COMMERCIAL

## 2020-05-26 DIAGNOSIS — E04.1 THYROID NODULE: ICD-10-CM

## 2020-05-26 PROCEDURE — 76536 US EXAM OF HEAD AND NECK: CPT

## 2020-05-27 ENCOUNTER — TELEPHONE (OUTPATIENT)
Dept: FAMILY MEDICINE CLINIC | Facility: CLINIC | Age: 45
End: 2020-05-27

## 2020-06-08 ENCOUNTER — APPOINTMENT (OUTPATIENT)
Dept: LAB | Facility: MEDICAL CENTER | Age: 45
End: 2020-06-08
Payer: COMMERCIAL

## 2020-06-08 DIAGNOSIS — E04.1 THYROID NODULE: ICD-10-CM

## 2020-06-08 DIAGNOSIS — E66.9 CLASS 1 OBESITY WITH BODY MASS INDEX (BMI) OF 30.0 TO 30.9 IN ADULT, UNSPECIFIED OBESITY TYPE, UNSPECIFIED WHETHER SERIOUS COMORBIDITY PRESENT: ICD-10-CM

## 2020-06-08 DIAGNOSIS — R53.83 FATIGUE, UNSPECIFIED TYPE: ICD-10-CM

## 2020-06-08 LAB
CHOLEST SERPL-MCNC: 209 MG/DL (ref 50–200)
ERYTHROCYTE [DISTWIDTH] IN BLOOD BY AUTOMATED COUNT: 12.8 % (ref 11.6–15.1)
HCT VFR BLD AUTO: 44.7 % (ref 34.8–46.1)
HDLC SERPL-MCNC: 58 MG/DL
HGB BLD-MCNC: 14.7 G/DL (ref 11.5–15.4)
LDLC SERPL CALC-MCNC: 134 MG/DL (ref 0–100)
MCH RBC QN AUTO: 30.8 PG (ref 26.8–34.3)
MCHC RBC AUTO-ENTMCNC: 32.9 G/DL (ref 31.4–37.4)
MCV RBC AUTO: 94 FL (ref 82–98)
NONHDLC SERPL-MCNC: 151 MG/DL
PLATELET # BLD AUTO: 228 THOUSANDS/UL (ref 149–390)
PMV BLD AUTO: 10.3 FL (ref 8.9–12.7)
RBC # BLD AUTO: 4.78 MILLION/UL (ref 3.81–5.12)
TRIGL SERPL-MCNC: 85 MG/DL
TSH SERPL DL<=0.05 MIU/L-ACNC: 0.62 UIU/ML (ref 0.36–3.74)
WBC # BLD AUTO: 8.45 THOUSAND/UL (ref 4.31–10.16)

## 2020-06-08 PROCEDURE — 80061 LIPID PANEL: CPT

## 2020-06-08 PROCEDURE — 85027 COMPLETE CBC AUTOMATED: CPT

## 2020-06-08 PROCEDURE — 36415 COLL VENOUS BLD VENIPUNCTURE: CPT

## 2020-06-08 PROCEDURE — 84443 ASSAY THYROID STIM HORMONE: CPT

## 2020-07-28 NOTE — PROGRESS NOTES
Assessment and Plan:   Ms Nestor Nichole a 42-year-old female with history significant for seropositive rheumatoid arthritis and positive hepatitis-B surface antigen, who presents for follow-up   She is currently on hydroxychloroquine 200 mg twice daily      # Seropositive rheumatoid arthritis  - Frandy presents today for follow-up of seropositive rheumatoid arthritis and is currently on hydroxychloroquine 200 mg twice daily   While she was undergoing screening tests for initiation of DMARD therapy, she was found to have a positive hepatitis B surface antigen and elevated HBV DNA viral load   In view of this we opted to start her on hydroxychloroquine        - She reports overall she has been doing well on the hydroxychloroquine, and is denying any significant arthralgias  Her physical examination is also unrevealing today  I feel like overall her rheumatoid arthritis is in remission  In view of this we will continue with the hydroxychloroquine 200 mg twice daily, and I advised her to follow up for annual eye exams  At this time there is no indication to step up DMARDs  She can take Tylenol or NSAIDs as needed      - I advised her to continue monitoring for worsening joint pains or additional joint involvement which may indicate the need to step up DMARD therapy  Kathy Border to doing so she will need to follow up with Gastroenterology to be started on antiviral therapy for the chronic hepatitis-B   For now I will see her back in the office in 1 year, but advised her to call with any concerns or flare-up in symptoms  Plan:  Diagnoses and all orders for this visit:    Seropositive rheumatoid arthritis ([facility] [address] )    Primary osteoarthritis of both hands    Long-term use of Plaquenil    Chronic hepatitis B (HCC)    Rheumatoid arthritis involving multiple sites with positive rheumatoid factor (HCC)  -     hydroxychloroquine (PLAQUENIL) 200 mg tablet;  Take 2 tablets (400 mg total) by mouth daily with breakfast      Activities as tolerated    Diet: low carb/low fat, more greens/vegetables, adequate hydration  Exercise: try to maintain a low impact exercise regimen as much as possible  Walk for 30 minutes a day for at least 3 days a week    Encouraged to maintain good sleep hygiene  Continue other medications as prescribed by PCP and other specialists        RTC in 1 year  HPI    INITIAL VISIT NOTE:  Ms Fox is a 51-year-old female with no significant past medical history, who presents for further evaluation of diffuse joint pains and a positive rheumatoid factor at a titer of 80   She is referred by Dr Supriya Nicole      Patient reports she has been experiencing diffuse joint pains over the past 2-3 years, but states in the past 1 year she has noticed worsening pain affecting her bilateral hands and feet   She says the symptoms have been gradually progressive over time, and affect her on a daily basis in a mostly constant manner   She does also intermittently experience pain at her wrists, shoulders, hips, knees and ankles, but these joint pains affect her more intermittently   She has noticed swelling affecting her bilateral hands and wrists, and does present with swelling of her left wrist today   She also experiences morning stiffness which affects her diffusely, including her hands, and states it lasts at least 1 hour but on occasion can persist throughout the day   She has not tried taking any over-the-counter pain medications  Moraima Pierson has custody of her 3 grand children, and states repetitive hand movements required while caring for them (such as buttoning) really aggravates her symptoms  She was seen by her primary care physician in view of these complaints and had additional testing done which revealed a positive rheumatoid factor at a titer of 80   CBC, CMP, DINA screen, Lyme antibody profile, ESR and TSH were unremarkable   She has not had any hand x-rays done      She also reports a history of dry skin affecting her palms, but states this has currently resolved with the use of moisturizers   She otherwise denies any other skin rash or history of psoriasis   No family history of autoimmune disease         5/23/2019:  Patient presents for follow-up of seropositive rheumatoid arthritis  Fausto Christian is currently on hydroxychloroquine 200 mg twice daily that was started in March 2019      She continues to report pain predominantly affecting her hands, feet, back and knees   She states the pain is most prominent in the morning but resolves during the day   She states the wrist swelling has resolved   On occasion she will still notice swelling affecting her fingers   She has also been noticing over the past few weeks an intense pain associated with swelling affecting the dorsum of her right foot, which will cause a spasm of her toes   This occurs and remits spontaneously  Dana Alu is otherwise no joint swelling noted   She experiences morning stiffness affecting the same joints which lasts 1-2 hours  Fausto Christian is currently not taking any over-the-counter pain medications   She has been tolerating the hydroxychloroquine well      She was seen by Gastroenterology following our prior office visit in view of the positive hepatitis B surface antigen  Fausto Christian was also found to have a positive viral load   Due to normal LFTs, and patient being asymptomatic, there was no indication to start her on antivirals unless there are plans for immunosuppressive therapy for the rheumatoid arthritis   In view of this she was started on hydroxychloroquine as opposed to other DMARDs  Fausto Christian will continue follow-up with Gastroenterology         9/3/2019:  Patient presents for a follow-up of seropositive rheumatoid arthritis  Fausto Christian is currently on hydroxychloroquine 200 mg twice daily      She reports with the hydroxychloroquine there has been an improvement in the bilateral wrist pain, and only on occasion will she experience pain going through her fingers   Her main complaint at this time is pain predominantly affecting her bilateral thumbs and index fingers at the IP and DIP joints, respectively  Ragini Frank is denying any other significant joint pains, except for ongoing low back pain   She notices occasional swelling affecting her fingers   She does experience morning stiffness which mostly affects her hands and lasts about 30 minutes   On occasion she will apply the diclofenac gel which does help her      She has otherwise been tolerating the hydroxychloroquine well, and it seems like she is up-to-date with her eye exams   No other complaints noted at this time      Of note she is continuing to follow-up with gastroenterology for the positive hepatitis B viral load, but there are no plans to start her on treatment unless we advance with rheumatoid arthritis treatment         1/29/2020:  Patient presents for a follow-up of seropositive rheumatoid arthritis  She is currently on hydroxychloroquine 200 mg twice daily      She reports overall she is doing well  She will still on occasion experience pain affecting her left hand PIP joints, as well as her shoulders and low back region  She denies any significant pain of her right hand, wrists, elbows, hips, knees, ankles or feet  She denies any swollen joints  She does experience morning stiffness which can affect her hands but improves within about 20 minutes  She is not taking any Tylenol or over-the-counter NSAIDs for her symptoms  She has been tolerating the hydroxychloroquine well without any concerns for side effects  She will be due for an annual eye exam later this year      She continues to follow-up with Gastroenterology periodically for the chronic hepatitis B infection  7/29/2020:  Patient presents for a follow-up of seropositive rheumatoid arthritis  She is currently on hydroxychloroquine 200 mg twice daily  She reports overall she is doing well and only experiences intermittent joint pains of her hands    She denies any other joint pain or swelling  She does experience mild morning stiffness of her bilateral hands which improves with activities  She is not taking any over-the-counter pain medications  If required she will use the Voltaren Gel topically  She reports usually during the summer months her joint pains are better but she may experience a flare-up during the winter  The following portions of the patient's history were reviewed and updated as appropriate: allergies, current medications, past family history, past medical history, past social history, past surgical history and problem list       Review of Systems  Constitutional: Negative for weight change, fevers, chills, night sweats, fatigue  ENT/Mouth: Negative for hearing changes, ear pain, nasal congestion, sinus pain, hoarseness, sore throat, rhinorrhea, swallowing difficulty  Eyes: Negative for pain, redness, discharge, vision changes  Cardiovascular: Negative for chest pain, SOB, palpitations  Respiratory: Negative for cough, sputum, wheezing, dyspnea  Gastrointestinal: Negative for nausea, vomiting, diarrhea, constipation, pain, heartburn  Genitourinary: Negative for dysuria, urinary frequency, hematuria  Musculoskeletal: As per HPI  Skin: Negative for skin rash, color changes  Neuro: Negative for weakness, numbness, tingling, loss of consciousness  Psych: Negative for anxiety, depression  Heme/Lymph: Negative for easy bruising, bleeding, lymphadenopathy          Past Medical History:   Diagnosis Date    Disease of thyroid gland        Past Surgical History:   Procedure Laterality Date    HYSTERECTOMY      TOTAL ABDOMINAL HYSTERECTOMY W/ BILATERAL SALPINGOOPHORECTOMY      TUBAL LIGATION         Social History     Socioeconomic History    Marital status: /Civil Union     Spouse name: Not on file    Number of children: 3    Years of education: Not on file    Highest education level: Not on file   Occupational History     Employer: [employer]   Social Needs    Financial resource strain: Not on file    Food insecurity:     Worry: Not on file     Inability: Not on file    Transportation needs:     Medical: Not on file     Non-medical: Not on file   Tobacco Use    Smoking status: Current Every Day Smoker     Packs/day: 1 00     Years: 20 00     Pack years: 20 00     Types: Cigarettes    Smokeless tobacco: Never Used   Substance and Sexual Activity    Alcohol use: Yes     Comment: rare    Drug use: No    Sexual activity: Yes   Lifestyle    Physical activity:     Days per week: Not on file     Minutes per session: Not on file    Stress: Not on file   Relationships    Social connections:     Talks on phone: Not on file     Gets together: Not on file     Attends [employer] service: Not on file     Active member of club or organization: Not on file     Attends meetings of clubs or organizations: Not on file     Relationship status: Not on file    Intimate partner violence:     Fear of current or ex partner: Not on file     Emotionally abused: Not on file     Physically abused: Not on file     Forced sexual activity: Not on file   Other Topics Concern    Not on file   Social History Narrative    Daily coffee consumption    Uses seatbelts       Family History   Problem Relation Age of Onset    Ovarian cancer Mother     No Known Problems Father     No Known Problems Brother     Pancreatic cancer Maternal Grandfather     Thyroid cancer Maternal Aunt     Liver cancer Maternal Uncle     Pancreatic cancer Maternal Uncle     No Known Problems Daughter     No Known Problems Maternal Grandmother     No Known Problems Paternal Grandmother     No Known Problems Paternal Grandfather     No Known Problems Son     No Known Problems Daughter     No Known Problems Maternal Uncle        Allergies   Allergen Reactions    Penicillins        Current Outpatient Medications:     diclofenac sodium (VOLTAREN) 1 %, Apply 2 g topically 3 (three) times a day as needed (Hand pain), Disp: 1 Tube, Rfl: 2    hydroxychloroquine (PLAQUENIL) 200 mg tablet, Take 2 tablets (400 mg total) by mouth daily with breakfast, Disp: 180 tablet, Rfl: 1      Objective:    Vitals:    07/29/20 1045   BP: 132/74   Pulse: 84   Temp: 98 3 °F (36 8 °C)   TempSrc: Tympanic   Height: 5' 6" (1 676 m)       Physical Exam  General: Well appearing, well nourished, in no distress  Oriented x 3, normal mood and affect  Ambulating without difficulty  Skin: Good turgor, no rash, unusual bruising or prominent lesions  Hair: Normal texture and distribution  Nails: Normal color, no deformities  HEENT:  Head: Normocephalic, atraumatic  Eyes: Conjunctiva clear, sclera non-icteric, EOM intact  Extremities: No amputations or deformities, cyanosis, edema  Musculoskeletal:   Hands, wrists, elbows and shoulders - there is no tenderness or soft tissue swelling noted  Neurologic: Alert and oriented  No focal neurological deficits appreciated  Psychiatric: Normal mood and affect  WISAM Kang    Rheumatology

## 2020-07-29 ENCOUNTER — OFFICE VISIT (OUTPATIENT)
Dept: RHEUMATOLOGY | Facility: CLINIC | Age: 45
End: 2020-07-29
Payer: COMMERCIAL

## 2020-07-29 VITALS
DIASTOLIC BLOOD PRESSURE: 74 MMHG | TEMPERATURE: 98.3 F | HEART RATE: 84 BPM | BODY MASS INDEX: 30.51 KG/M2 | HEIGHT: 66 IN | SYSTOLIC BLOOD PRESSURE: 132 MMHG

## 2020-07-29 DIAGNOSIS — M05.9 SEROPOSITIVE RHEUMATOID ARTHRITIS (HCC): Primary | ICD-10-CM

## 2020-07-29 DIAGNOSIS — M19.042 PRIMARY OSTEOARTHRITIS OF BOTH HANDS: ICD-10-CM

## 2020-07-29 DIAGNOSIS — M05.79 RHEUMATOID ARTHRITIS INVOLVING MULTIPLE SITES WITH POSITIVE RHEUMATOID FACTOR (HCC): ICD-10-CM

## 2020-07-29 DIAGNOSIS — Z79.899 LONG-TERM USE OF PLAQUENIL: ICD-10-CM

## 2020-07-29 DIAGNOSIS — B18.1 CHRONIC HEPATITIS B (HCC): ICD-10-CM

## 2020-07-29 DIAGNOSIS — M19.041 PRIMARY OSTEOARTHRITIS OF BOTH HANDS: ICD-10-CM

## 2020-07-29 PROCEDURE — 99214 OFFICE O/P EST MOD 30 MIN: CPT | Performed by: INTERNAL MEDICINE

## 2020-07-29 RX ORDER — HYDROXYCHLOROQUINE SULFATE 200 MG/1
400 TABLET, FILM COATED ORAL
Qty: 180 TABLET | Refills: 1 | Status: SHIPPED | OUTPATIENT
Start: 2020-07-29 | End: 2021-04-05 | Stop reason: SDUPTHER

## 2020-10-05 ENCOUNTER — TELEPHONE (OUTPATIENT)
Dept: GASTROENTEROLOGY | Facility: AMBULARY SURGERY CENTER | Age: 45
End: 2020-10-05

## 2020-11-30 ENCOUNTER — OFFICE VISIT (OUTPATIENT)
Dept: FAMILY MEDICINE CLINIC | Facility: CLINIC | Age: 45
End: 2020-11-30
Payer: COMMERCIAL

## 2020-11-30 VITALS
HEART RATE: 110 BPM | BODY MASS INDEX: 30.86 KG/M2 | WEIGHT: 192 LBS | DIASTOLIC BLOOD PRESSURE: 70 MMHG | HEIGHT: 66 IN | RESPIRATION RATE: 18 BRPM | SYSTOLIC BLOOD PRESSURE: 118 MMHG | TEMPERATURE: 96.7 F | OXYGEN SATURATION: 97 %

## 2020-11-30 DIAGNOSIS — L50.9 URTICARIA: Primary | ICD-10-CM

## 2020-11-30 PROBLEM — M06.9 RHEUMATOID ARTHRITIS INVOLVING MULTIPLE SITES (HCC): Status: ACTIVE | Noted: 2020-11-30

## 2020-11-30 PROCEDURE — 4004F PT TOBACCO SCREEN RCVD TLK: CPT | Performed by: PHYSICIAN ASSISTANT

## 2020-11-30 PROCEDURE — 99213 OFFICE O/P EST LOW 20 MIN: CPT | Performed by: PHYSICIAN ASSISTANT

## 2020-11-30 RX ORDER — PREDNISONE 10 MG/1
10 TABLET ORAL 2 TIMES DAILY WITH MEALS
Qty: 10 TABLET | Refills: 0 | Status: SHIPPED | OUTPATIENT
Start: 2020-11-30 | End: 2021-05-17 | Stop reason: ALTCHOICE

## 2020-11-30 RX ORDER — CETIRIZINE HYDROCHLORIDE 10 MG/1
10 TABLET ORAL DAILY
Qty: 30 TABLET | Refills: 0 | Status: SHIPPED | OUTPATIENT
Start: 2020-11-30 | End: 2020-12-22

## 2020-12-07 ENCOUNTER — OFFICE VISIT (OUTPATIENT)
Dept: URGENT CARE | Facility: MEDICAL CENTER | Age: 45
End: 2020-12-07
Payer: COMMERCIAL

## 2020-12-07 VITALS
OXYGEN SATURATION: 99 % | BODY MASS INDEX: 31.18 KG/M2 | DIASTOLIC BLOOD PRESSURE: 80 MMHG | WEIGHT: 194 LBS | TEMPERATURE: 97.7 F | SYSTOLIC BLOOD PRESSURE: 123 MMHG | HEART RATE: 86 BPM | RESPIRATION RATE: 18 BRPM | HEIGHT: 66 IN

## 2020-12-07 DIAGNOSIS — S29.011A MUSCLE STRAIN OF CHEST WALL, INITIAL ENCOUNTER: Primary | ICD-10-CM

## 2020-12-07 PROCEDURE — 99213 OFFICE O/P EST LOW 20 MIN: CPT | Performed by: PHYSICIAN ASSISTANT

## 2020-12-07 RX ORDER — IBUPROFEN 800 MG/1
800 TABLET ORAL EVERY 8 HOURS PRN
Qty: 30 TABLET | Refills: 0 | Status: SHIPPED | OUTPATIENT
Start: 2020-12-07 | End: 2021-05-17 | Stop reason: ALTCHOICE

## 2020-12-07 RX ORDER — CYCLOBENZAPRINE HCL 10 MG
10 TABLET ORAL
Qty: 20 TABLET | Refills: 0 | Status: SHIPPED | OUTPATIENT
Start: 2020-12-07 | End: 2021-05-17 | Stop reason: ALTCHOICE

## 2020-12-22 DIAGNOSIS — L50.9 URTICARIA: ICD-10-CM

## 2020-12-22 RX ORDER — CETIRIZINE HYDROCHLORIDE 10 MG/1
TABLET ORAL
Qty: 30 TABLET | Refills: 0 | Status: SHIPPED | OUTPATIENT
Start: 2020-12-22 | End: 2021-05-17 | Stop reason: ALTCHOICE

## 2021-03-30 DIAGNOSIS — Z23 ENCOUNTER FOR IMMUNIZATION: ICD-10-CM

## 2021-04-01 ENCOUNTER — IMMUNIZATIONS (OUTPATIENT)
Dept: FAMILY MEDICINE CLINIC | Facility: HOSPITAL | Age: 46
End: 2021-04-01

## 2021-04-01 DIAGNOSIS — Z23 ENCOUNTER FOR IMMUNIZATION: Primary | ICD-10-CM

## 2021-04-01 PROCEDURE — 91300 SARS-COV-2 / COVID-19 MRNA VACCINE (PFIZER-BIONTECH) 30 MCG: CPT

## 2021-04-01 PROCEDURE — 0001A SARS-COV-2 / COVID-19 MRNA VACCINE (PFIZER-BIONTECH) 30 MCG: CPT

## 2021-04-05 DIAGNOSIS — M05.79 RHEUMATOID ARTHRITIS INVOLVING MULTIPLE SITES WITH POSITIVE RHEUMATOID FACTOR (HCC): ICD-10-CM

## 2021-04-05 RX ORDER — HYDROXYCHLOROQUINE SULFATE 200 MG/1
400 TABLET, FILM COATED ORAL
Qty: 180 TABLET | Refills: 1 | Status: SHIPPED | OUTPATIENT
Start: 2021-04-05 | End: 2021-10-05

## 2021-04-24 ENCOUNTER — IMMUNIZATIONS (OUTPATIENT)
Dept: FAMILY MEDICINE CLINIC | Facility: HOSPITAL | Age: 46
End: 2021-04-24

## 2021-04-24 DIAGNOSIS — Z23 ENCOUNTER FOR IMMUNIZATION: Primary | ICD-10-CM

## 2021-04-24 PROCEDURE — 91300 SARS-COV-2 / COVID-19 MRNA VACCINE (PFIZER-BIONTECH) 30 MCG: CPT

## 2021-04-24 PROCEDURE — 0002A SARS-COV-2 / COVID-19 MRNA VACCINE (PFIZER-BIONTECH) 30 MCG: CPT

## 2021-05-17 ENCOUNTER — TELEMEDICINE (OUTPATIENT)
Dept: FAMILY MEDICINE CLINIC | Facility: CLINIC | Age: 46
End: 2021-05-17
Payer: COMMERCIAL

## 2021-05-17 VITALS — TEMPERATURE: 102 F | WEIGHT: 169 LBS | HEIGHT: 67 IN | BODY MASS INDEX: 26.53 KG/M2

## 2021-05-17 DIAGNOSIS — J01.00 ACUTE NON-RECURRENT MAXILLARY SINUSITIS: Primary | ICD-10-CM

## 2021-05-17 PROCEDURE — 3008F BODY MASS INDEX DOCD: CPT | Performed by: FAMILY MEDICINE

## 2021-05-17 PROCEDURE — 4004F PT TOBACCO SCREEN RCVD TLK: CPT | Performed by: FAMILY MEDICINE

## 2021-05-17 PROCEDURE — 3725F SCREEN DEPRESSION PERFORMED: CPT | Performed by: FAMILY MEDICINE

## 2021-05-17 PROCEDURE — 99213 OFFICE O/P EST LOW 20 MIN: CPT | Performed by: FAMILY MEDICINE

## 2021-05-17 RX ORDER — AZITHROMYCIN 250 MG/1
TABLET, FILM COATED ORAL
Qty: 6 TABLET | Refills: 0 | Status: SHIPPED | OUTPATIENT
Start: 2021-05-17 | End: 2021-05-22

## 2021-05-17 NOTE — PROGRESS NOTES
Virtual Regular Visit      Assessment/Plan:    Problem List Items Addressed This Visit     None      Visit Diagnoses     Acute non-recurrent maxillary sinusitis    -  Primary    Relevant Medications    azithromycin (Zithromax) 250 mg tablet               Reason for visit is   Chief Complaint   Patient presents with   Danella Haagensen Like Symptoms    Virtual Regular Visit        Encounter provider Triny Ramos MD    Provider located at [address] [place] [id number] [phone]      Recent Visits  No visits were found meeting these conditions  Showing recent visits within past 7 days and meeting all other requirements     Today's Visits  Date Type Provider Dept   05/17/21 Telemedicine Triny Ramos MD [facility]   Showing today's visits and meeting all other requirements     Future Appointments  No visits were found meeting these conditions  Showing future appointments within next 150 days and meeting all other requirements        The patient was identified by name and date of birth  Fabián Valerio was informed that this is a telemedicine visit and that the visit is being conducted through [address] [place] [address] Now and patient was informed that this is a secure, HIPAA-compliant platform  She agrees to proceed     My office door was closed  No one else was in the room  She acknowledged consent and understanding of privacy and security of the video platform  The patient has agreed to participate and understands they can discontinue the visit at any time  Patient is aware this is a billable service  Tatiana Khan is a 39 y o  female fever   Pt with sx of head congestion and cough x 4 days, COVID test this am was negative  No sick contacts  Using tylenol, mucinex  Fully vaccinated against covid          Past Medical History:   Diagnosis Date    Disease of thyroid gland     Rheumatoid arthritis involving multiple sites ([facility] [address] ) 11/30/2020 Past Surgical History:   Procedure Laterality Date    HYSTERECTOMY      TOTAL ABDOMINAL HYSTERECTOMY W/ BILATERAL SALPINGOOPHORECTOMY      TUBAL LIGATION         Current Outpatient Medications   Medication Sig Dispense Refill    azithromycin (Zithromax) 250 mg tablet Take 2 tablets (500 mg total) by mouth daily for 1 day, THEN 1 tablet (250 mg total) daily for 4 days  6 tablet 0    hydroxychloroquine (PLAQUENIL) 200 mg tablet TAKE 2 TABLETS (400 MG TOTAL) BY MOUTH DAILY WITH BREAKFAST 180 tablet 1     No current facility-administered medications for this visit  Allergies   Allergen Reactions    Penicillins        Review of Systems   Constitutional: Positive for fatigue and fever (tmax 102)  HENT: Positive for congestion, ear pain, sinus pressure and sinus pain  Negative for dental problem  Eyes: Negative for visual disturbance  Respiratory: Positive for cough  Negative for chest tightness and shortness of breath  Cardiovascular: Negative for chest pain and palpitations  Gastrointestinal: Negative for abdominal pain  Genitourinary: Negative for difficulty urinating  Musculoskeletal: Negative for arthralgias  Neurological: Negative for headaches  Hematological: Does not bruise/bleed easily  Video Exam    Vitals:    05/17/21 0945   Temp: (!) 102 °F (38 9 °C)   Weight: 76 7 kg (169 lb)   Height: 5' 7" (1 702 m)       Physical Exam  Constitutional:       Appearance: Normal appearance  She is well-developed  HENT:      Head: Normocephalic and atraumatic  Comments: Sinus tenderness  Neurological:      Mental Status: She is alert and oriented to person, place, and time  Psychiatric:         Mood and Affect: Mood normal          Behavior: Behavior normal          Thought Content:  Thought content normal          Judgment: Judgment normal           I spent 8 minutes directly with the patient during this visit      VIRTUAL VISIT [address] acknowledges that she has consented to an online visit or consultation  She understands that the online visit is based solely on information provided by her, and that, in the absence of a face-to-face physical evaluation by the physician, the diagnosis she receives is both limited and provisional in terms of accuracy and completeness  This is not intended to replace a full medical face-to-face evaluation by the physician  Dang Tanner understands and accepts these terms

## 2021-05-17 NOTE — LETTER
May 17, 2021    Patient:  Kiko Robledo  YOB: 1975  Date of Last Encounter: 12/22/2020    To whom it may concern:    Kiko Robledo has tested negative for COVID-19 (Coronavirus)  She may return to work on 5 18/21      Sincerely,        Chelsey Corcoran MD

## 2021-07-09 ENCOUNTER — TELEPHONE (OUTPATIENT)
Dept: RHEUMATOLOGY | Facility: CLINIC | Age: 46
End: 2021-07-09

## 2021-07-09 NOTE — TELEPHONE ENCOUNTER
Left detailed message cancelling patients appointment on 8/3 due to Dr Quinton Banegas having PTO and asked that she call to reschedule her follow up appt with us

## 2021-10-05 DIAGNOSIS — M05.79 RHEUMATOID ARTHRITIS INVOLVING MULTIPLE SITES WITH POSITIVE RHEUMATOID FACTOR (HCC): ICD-10-CM

## 2021-10-05 RX ORDER — HYDROXYCHLOROQUINE SULFATE 200 MG/1
400 TABLET, FILM COATED ORAL
Qty: 180 TABLET | Refills: 1 | Status: SHIPPED | OUTPATIENT
Start: 2021-10-05 | End: 2022-03-28

## 2021-11-01 ENCOUNTER — OFFICE VISIT (OUTPATIENT)
Dept: URGENT CARE | Facility: MEDICAL CENTER | Age: 46
End: 2021-11-01
Payer: COMMERCIAL

## 2021-11-01 VITALS
BODY MASS INDEX: 25.11 KG/M2 | DIASTOLIC BLOOD PRESSURE: 71 MMHG | TEMPERATURE: 97.5 F | RESPIRATION RATE: 18 BRPM | HEART RATE: 84 BPM | SYSTOLIC BLOOD PRESSURE: 115 MMHG | HEIGHT: 67 IN | OXYGEN SATURATION: 98 % | WEIGHT: 160 LBS

## 2021-11-01 DIAGNOSIS — R42 DIZZINESS AND GIDDINESS: Primary | ICD-10-CM

## 2021-11-01 LAB
GLUCOSE SERPL-MCNC: 93 MG/DL (ref 65–140)
SL AMB POCT GLUCOSE BLD: 93

## 2021-11-01 PROCEDURE — 82948 REAGENT STRIP/BLOOD GLUCOSE: CPT | Performed by: PHYSICIAN ASSISTANT

## 2021-11-01 PROCEDURE — 99213 OFFICE O/P EST LOW 20 MIN: CPT | Performed by: PHYSICIAN ASSISTANT

## 2021-11-01 RX ORDER — MECLIZINE HYDROCHLORIDE 25 MG/1
25 TABLET ORAL 3 TIMES DAILY PRN
Qty: 30 TABLET | Refills: 0 | Status: SHIPPED | OUTPATIENT
Start: 2021-11-01

## 2021-11-19 ENCOUNTER — OFFICE VISIT (OUTPATIENT)
Dept: FAMILY MEDICINE CLINIC | Facility: CLINIC | Age: 46
End: 2021-11-19
Payer: COMMERCIAL

## 2021-11-19 VITALS
HEART RATE: 82 BPM | WEIGHT: 163.4 LBS | OXYGEN SATURATION: 99 % | DIASTOLIC BLOOD PRESSURE: 74 MMHG | BODY MASS INDEX: 25.65 KG/M2 | HEIGHT: 67 IN | TEMPERATURE: 97.9 F | SYSTOLIC BLOOD PRESSURE: 118 MMHG

## 2021-11-19 DIAGNOSIS — M06.9 RHEUMATOID ARTHRITIS INVOLVING MULTIPLE SITES, UNSPECIFIED WHETHER RHEUMATOID FACTOR PRESENT (HCC): ICD-10-CM

## 2021-11-19 DIAGNOSIS — Z00.00 ANNUAL PHYSICAL EXAM: Primary | ICD-10-CM

## 2021-11-19 DIAGNOSIS — R76.8 HEPATITIS B SURFACE ANTIGEN POSITIVE: ICD-10-CM

## 2021-11-19 DIAGNOSIS — Z12.31 ENCOUNTER FOR SCREENING MAMMOGRAM FOR MALIGNANT NEOPLASM OF BREAST: ICD-10-CM

## 2021-11-19 DIAGNOSIS — Z86.39 HISTORY OF THYROID NODULE: ICD-10-CM

## 2021-11-19 DIAGNOSIS — B18.1 CHRONIC HEPATITIS B (HCC): ICD-10-CM

## 2021-11-19 DIAGNOSIS — M05.9 SEROPOSITIVE RHEUMATOID ARTHRITIS (HCC): ICD-10-CM

## 2021-11-19 DIAGNOSIS — Z72.0 TOBACCO USE: ICD-10-CM

## 2021-11-19 PROCEDURE — 99213 OFFICE O/P EST LOW 20 MIN: CPT | Performed by: FAMILY MEDICINE

## 2021-11-19 PROCEDURE — 4004F PT TOBACCO SCREEN RCVD TLK: CPT | Performed by: FAMILY MEDICINE

## 2021-11-19 PROCEDURE — 99396 PREV VISIT EST AGE 40-64: CPT | Performed by: FAMILY MEDICINE

## 2021-11-19 PROCEDURE — 99407 BEHAV CHNG SMOKING > 10 MIN: CPT | Performed by: FAMILY MEDICINE

## 2021-11-19 PROCEDURE — 3725F SCREEN DEPRESSION PERFORMED: CPT | Performed by: FAMILY MEDICINE

## 2021-11-19 PROCEDURE — 3008F BODY MASS INDEX DOCD: CPT | Performed by: FAMILY MEDICINE

## 2021-11-19 RX ORDER — NICOTINE 21 MG/24HR
1 PATCH, TRANSDERMAL 24 HOURS TRANSDERMAL EVERY 24 HOURS
Qty: 28 PATCH | Refills: 2 | Status: SHIPPED | OUTPATIENT
Start: 2021-11-19

## 2022-03-28 DIAGNOSIS — M05.79 RHEUMATOID ARTHRITIS INVOLVING MULTIPLE SITES WITH POSITIVE RHEUMATOID FACTOR (HCC): ICD-10-CM

## 2022-03-28 RX ORDER — HYDROXYCHLOROQUINE SULFATE 200 MG/1
400 TABLET, FILM COATED ORAL
Qty: 180 TABLET | Refills: 1 | Status: SHIPPED | OUTPATIENT
Start: 2022-03-28 | End: 2022-09-24

## 2022-07-19 ENCOUNTER — OFFICE VISIT (OUTPATIENT)
Dept: FAMILY MEDICINE CLINIC | Facility: CLINIC | Age: 47
End: 2022-07-19
Payer: COMMERCIAL

## 2022-07-19 ENCOUNTER — APPOINTMENT (OUTPATIENT)
Dept: LAB | Facility: MEDICAL CENTER | Age: 47
End: 2022-07-19
Payer: COMMERCIAL

## 2022-07-19 VITALS
BODY MASS INDEX: 27.5 KG/M2 | OXYGEN SATURATION: 99 % | WEIGHT: 175.2 LBS | HEART RATE: 87 BPM | DIASTOLIC BLOOD PRESSURE: 76 MMHG | TEMPERATURE: 98 F | SYSTOLIC BLOOD PRESSURE: 116 MMHG | HEIGHT: 67 IN

## 2022-07-19 DIAGNOSIS — B18.1 CHRONIC HEPATITIS B (HCC): ICD-10-CM

## 2022-07-19 DIAGNOSIS — R21 RASH: ICD-10-CM

## 2022-07-19 DIAGNOSIS — J02.9 SORE THROAT: Primary | ICD-10-CM

## 2022-07-19 DIAGNOSIS — Z86.39 HISTORY OF THYROID NODULE: ICD-10-CM

## 2022-07-19 DIAGNOSIS — Z72.0 TOBACCO USE: ICD-10-CM

## 2022-07-19 DIAGNOSIS — R76.8 HEPATITIS B SURFACE ANTIGEN POSITIVE: ICD-10-CM

## 2022-07-19 LAB
ALBUMIN SERPL BCP-MCNC: 4.1 G/DL (ref 3.5–5)
ALP SERPL-CCNC: 76 U/L (ref 46–116)
ALT SERPL W P-5'-P-CCNC: 29 U/L (ref 12–78)
ANION GAP SERPL CALCULATED.3IONS-SCNC: 7 MMOL/L (ref 4–13)
AST SERPL W P-5'-P-CCNC: 17 U/L (ref 5–45)
BASOPHILS # BLD AUTO: 0.04 THOUSANDS/ΜL (ref 0–0.1)
BASOPHILS NFR BLD AUTO: 1 % (ref 0–1)
BILIRUB SERPL-MCNC: 0.47 MG/DL (ref 0.2–1)
BUN SERPL-MCNC: 15 MG/DL (ref 5–25)
CALCIUM SERPL-MCNC: 9.8 MG/DL (ref 8.3–10.1)
CHLORIDE SERPL-SCNC: 107 MMOL/L (ref 96–108)
CHOLEST SERPL-MCNC: 218 MG/DL
CO2 SERPL-SCNC: 26 MMOL/L (ref 21–32)
CREAT SERPL-MCNC: 0.75 MG/DL (ref 0.6–1.3)
EOSINOPHIL # BLD AUTO: 0.13 THOUSAND/ΜL (ref 0–0.61)
EOSINOPHIL NFR BLD AUTO: 2 % (ref 0–6)
ERYTHROCYTE [DISTWIDTH] IN BLOOD BY AUTOMATED COUNT: 12.7 % (ref 11.6–15.1)
GFR SERPL CREATININE-BSD FRML MDRD: 95 ML/MIN/1.73SQ M
GLUCOSE SERPL-MCNC: 74 MG/DL (ref 65–140)
HCT VFR BLD AUTO: 42.5 % (ref 34.8–46.1)
HDLC SERPL-MCNC: 60 MG/DL
HGB BLD-MCNC: 14 G/DL (ref 11.5–15.4)
IMM GRANULOCYTES # BLD AUTO: 0.02 THOUSAND/UL (ref 0–0.2)
IMM GRANULOCYTES NFR BLD AUTO: 0 % (ref 0–2)
LDLC SERPL CALC-MCNC: 139 MG/DL (ref 0–100)
LYMPHOCYTES # BLD AUTO: 2.63 THOUSANDS/ΜL (ref 0.6–4.47)
LYMPHOCYTES NFR BLD AUTO: 32 % (ref 14–44)
MCH RBC QN AUTO: 30.4 PG (ref 26.8–34.3)
MCHC RBC AUTO-ENTMCNC: 32.9 G/DL (ref 31.4–37.4)
MCV RBC AUTO: 92 FL (ref 82–98)
MONOCYTES # BLD AUTO: 0.45 THOUSAND/ΜL (ref 0.17–1.22)
MONOCYTES NFR BLD AUTO: 5 % (ref 4–12)
NEUTROPHILS # BLD AUTO: 5.08 THOUSANDS/ΜL (ref 1.85–7.62)
NEUTS SEG NFR BLD AUTO: 60 % (ref 43–75)
NRBC BLD AUTO-RTO: 0 /100 WBCS
PLATELET # BLD AUTO: 262 THOUSANDS/UL (ref 149–390)
PMV BLD AUTO: 10.3 FL (ref 8.9–12.7)
POTASSIUM SERPL-SCNC: 4.1 MMOL/L (ref 3.5–5.3)
PROT SERPL-MCNC: 7.7 G/DL (ref 6.4–8.4)
RBC # BLD AUTO: 4.61 MILLION/UL (ref 3.81–5.12)
S PYO AG THROAT QL: NEGATIVE
SODIUM SERPL-SCNC: 140 MMOL/L (ref 135–147)
TRIGL SERPL-MCNC: 97 MG/DL
TSH SERPL DL<=0.05 MIU/L-ACNC: 0.67 UIU/ML (ref 0.45–4.5)
WBC # BLD AUTO: 8.35 THOUSAND/UL (ref 4.31–10.16)

## 2022-07-19 PROCEDURE — 80061 LIPID PANEL: CPT

## 2022-07-19 PROCEDURE — 84443 ASSAY THYROID STIM HORMONE: CPT

## 2022-07-19 PROCEDURE — 36415 COLL VENOUS BLD VENIPUNCTURE: CPT

## 2022-07-19 PROCEDURE — 87880 STREP A ASSAY W/OPTIC: CPT | Performed by: FAMILY MEDICINE

## 2022-07-19 PROCEDURE — 87517 HEPATITIS B DNA QUANT: CPT

## 2022-07-19 PROCEDURE — 85025 COMPLETE CBC W/AUTO DIFF WBC: CPT

## 2022-07-19 PROCEDURE — 3725F SCREEN DEPRESSION PERFORMED: CPT | Performed by: FAMILY MEDICINE

## 2022-07-19 PROCEDURE — 80053 COMPREHEN METABOLIC PANEL: CPT

## 2022-07-19 PROCEDURE — 99214 OFFICE O/P EST MOD 30 MIN: CPT | Performed by: FAMILY MEDICINE

## 2022-07-19 NOTE — PROGRESS NOTES
Assessment/Plan:    Rash  Erythema does, non blanchable rash with clear distinct border on bilateral medial aspect of both ankles  The rash is located above the sock line  Patient reports some tenderness over the rash  Denies any recent activities such as hiking a patient does have prolonged standing    Patient has sore throat that occurred around the same time  Unclear if both are related  This may be early sign purpura, concern for kidney function   Will obtain CBC, CMP for evaluation    Patient's rapid strep negative, this is likely not sign off strep throat such as erythema multiform    Sore throat  Unclear etiology  May be secondary to viral URI  Patient does have tobacco use, there is rash on lower extremity  Obtain rapid strep today for evaluation  If positive, will start patient antibiotic for treatment  Continue to monitor symptoms  Symptom does not improve over the course of next month, please return for evaluation for possible ultrasound evaluation      Subjective:      Patient ID: Tamiko Meza is a 52 y o  female  HPI    44-year-old female patient presents for evaluation of rash on bilateral lower legs  Patient has noticed rash after waking up, rashes localized above the sock line on the medial aspect to her bilateral legs  Patient noticed some increased warms to the affected area  Denies any recent injuries, no new exposure to chemicals  Patient is on her feet all day as per her job  Of note, patient recently has had some sore throat  Pt expressed concern for her kidney as her uncle had similar finding and was found to have kidney failure  Review of Systems   Constitutional: Negative for chills and fever  HENT: Positive for sore throat  Negative for congestion and rhinorrhea  Respiratory: Negative for cough, chest tightness and shortness of breath  Cardiovascular: Negative for chest pain  Gastrointestinal: Negative for abdominal pain  Musculoskeletal: Negative for arthralgias  Skin: Positive for rash  Bilateral lower legs   Neurological: Negative for headaches  Objective:    /76 (BP Location: Right arm, Patient Position: Sitting, Cuff Size: Standard)   Pulse 87   Temp 98 °F (36 7 °C) (Temporal)   Ht 5' 7" (1 702 m)   Wt 79 5 kg (175 lb 3 2 oz)   SpO2 99%   BMI 27 44 kg/m²       Physical Exam  Vitals reviewed  Constitutional:       General: She is not in acute distress  Appearance: Normal appearance  She is not ill-appearing, toxic-appearing or diaphoretic  Cardiovascular:      Rate and Rhythm: Normal rate and regular rhythm  Pulses: Normal pulses  Heart sounds: Normal heart sounds  No murmur heard  Pulmonary:      Effort: Pulmonary effort is normal  No respiratory distress  Breath sounds: Normal breath sounds  Abdominal:      General: Abdomen is flat  Musculoskeletal:         General: No swelling, tenderness, deformity or signs of injury  Skin:     General: Skin is warm  Capillary Refill: Capillary refill takes less than 2 seconds  Neurological:      General: No focal deficit present  Mental Status: She is alert  Psychiatric:         Mood and Affect: Mood normal           WISAM Barboza  Family Medicine    Please excuse any "sound-alike" errors that may have ocurred during the process of dictation  Parts of this note have been dictated and there may be errors present in the transcription process  Thank you

## 2022-07-19 NOTE — ASSESSMENT & PLAN NOTE
Erythema does, non blanchable rash with clear distinct border on bilateral medial aspect of both ankles  The rash is located above the sock line  Patient reports some tenderness over the rash  Denies any recent activities such as hiking a patient does have prolonged standing    Patient has sore throat that occurred around the same time  Unclear if both are related  This may be early sign purpura, concern for kidney function   Will obtain CBC, CMP for evaluation    Patient's rapid strep negative, this is likely not sign off strep throat such as erythema multiform

## 2022-07-19 NOTE — ASSESSMENT & PLAN NOTE
Unclear etiology  May be secondary to viral URI  Patient does have tobacco use, there is rash on lower extremity  Obtain rapid strep today for evaluation  If positive, will start patient antibiotic for treatment  Continue to monitor symptoms  Symptom does not improve over the course of next month, please return for evaluation for possible ultrasound evaluation

## 2022-07-22 LAB
HBV DNA SERPL NAA+PROBE-ACNC: NORMAL IU/ML
HBV DNA SERPL NAA+PROBE-LOG IU: 4 LOG10 IU/ML
REF LAB TEST REF RANGE: NORMAL

## 2022-09-25 ENCOUNTER — APPOINTMENT (OUTPATIENT)
Dept: RADIOLOGY | Facility: CLINIC | Age: 47
End: 2022-09-25
Payer: COMMERCIAL

## 2022-09-25 ENCOUNTER — OFFICE VISIT (OUTPATIENT)
Dept: URGENT CARE | Facility: CLINIC | Age: 47
End: 2022-09-25
Payer: COMMERCIAL

## 2022-09-25 VITALS
OXYGEN SATURATION: 98 % | DIASTOLIC BLOOD PRESSURE: 78 MMHG | HEART RATE: 80 BPM | SYSTOLIC BLOOD PRESSURE: 120 MMHG | BODY MASS INDEX: 27.21 KG/M2 | WEIGHT: 173.4 LBS | HEIGHT: 67 IN

## 2022-09-25 DIAGNOSIS — S83.92XA SPRAIN OF LEFT KNEE, UNSPECIFIED LIGAMENT, INITIAL ENCOUNTER: ICD-10-CM

## 2022-09-25 DIAGNOSIS — S83.92XA SPRAIN OF LEFT KNEE, UNSPECIFIED LIGAMENT, INITIAL ENCOUNTER: Primary | ICD-10-CM

## 2022-09-25 PROCEDURE — 99213 OFFICE O/P EST LOW 20 MIN: CPT

## 2022-09-25 PROCEDURE — 73564 X-RAY EXAM KNEE 4 OR MORE: CPT

## 2022-09-25 NOTE — PATIENT INSTRUCTIONS
-Practice RICE : rest the injured area, apply ice, apply compression such as an ACE wrap to the site, and elevation- keep the injured area up     -For pain take an NSAID such as ibuprofen, Aleve, or motrin if able  This will decrease pain and swelling to the area  If unable to take, can try over the counter Voltaren cream instead  -If pain continues can also take these medications - can try over the counter medications (these do not need a prescription) such as acetaminophen (Tylenol), lidocaine or other pain patches, Voltaren cream, or lidocaine cream      -Follow up with orthopedics  There is an orthopedics site in the same building as the [facility] now call [phone] to schedule an appointment

## 2022-09-25 NOTE — PROGRESS NOTES
[address] [employer] Now        NAME: Jamar Ring is a 52 y o  female  : 1975    MRN: [id number]  DATE: 2022  TIME: 10:23 AM    Assessment and Plan   Sprain of left knee, unspecified ligament, initial encounter [S83  92XA]  1  Sprain of left knee, unspecified ligament, initial encounter  XR knee 4+ vw left injury     XR knee with no acute intraosseous abnormality  Start with ibuprofen for pain  Follow up with orthopedics if symptoms do not improve, referral given  Placed in knee brace, able to walk with that  Given that she works M-F in [address] constantly on her feet, given note to be off to allow for knee rest        Patient Instructions     -Practice RICE : rest the injured area, apply ice, apply compression such as an ACE wrap to the site, and elevation- keep the injured area up     -For pain take an NSAID such as ibuprofen, Aleve, or motrin if able  This will decrease pain and swelling to the area  If unable to take, can try over the counter Voltaren cream instead  -If pain continues can also take these medications - can try over the counter medications (these do not need a prescription) such as acetaminophen (Tylenol), lidocaine or other pain patches, Voltaren cream, or lidocaine cream      -Follow up with orthopedics  There is an orthopedics site in the same building as the [facility] now call [phone] to schedule an appointment  Chief Complaint     Chief Complaint   Patient presents with    Knee Pain     Left knee injured it yesterday as she was rollerskating with her grand kids  As she was trying to help one from falling, lost her balance, her left leg twisted and she heard popping  History of Present Illness       Presents with 1 day of knee pain that started after roller skaing injury  She was trying to help her grandchild from falling when she lost balance and went down hitting the knee  Her entire knee twisted and then she fell onto her left foot   Foot is not causing any pain  She heard lots of popping noises  Denies history of knee injuries  She is able to walk on the knee but limping and painful - limited ability to bear weight  Pain surrounding the entire knee but worse to the lateral portion of the knee  Review of Systems   Review of Systems   Constitutional: Negative for chills, fatigue and fever  HENT: Negative for congestion  Respiratory: Negative for cough, shortness of breath and wheezing  Cardiovascular: Negative for chest pain  Gastrointestinal: Negative for abdominal pain  Genitourinary: Negative for dysuria  Musculoskeletal: Positive for gait problem (due to left knee pain) and myalgias  Skin: Negative for wound  Neurological: Negative for dizziness  Psychiatric/Behavioral: Negative for confusion           Current Medications       Current Outpatient Medications:     hydroxychloroquine (PLAQUENIL) 200 mg tablet, TAKE 2 TABLETS (400 MG TOTAL) BY MOUTH DAILY WITH BREAKFAST, Disp: 180 tablet, Rfl: 1    meclizine (ANTIVERT) 25 mg tablet, Take 1 tablet (25 mg total) by mouth 3 (three) times a day as needed for dizziness (Patient not taking: No sig reported), Disp: 30 tablet, Rfl: 0    nicotine (NICODERM CQ) 21 mg/24 hr TD 24 hr patch, Place 1 patch on the skin every 24 hours (Patient not taking: Reported on 7/19/2022), Disp: 28 patch, Rfl: 2    Current Allergies     Allergies as of 09/25/2022 - Reviewed 09/25/2022   Allergen Reaction Noted    Penicillins  03/06/2014            The following portions of the patient's history were reviewed and updated as appropriate: allergies, current medications, past family history, past medical history, past social history, past surgical history and problem list      Past Medical History:   Diagnosis Date    Disease of thyroid gland     Rheumatoid arthritis involving multiple sites ([facility] [address] ) 11/30/2020       Past Surgical History:   Procedure Laterality Date    HYSTERECTOMY      TOTAL ABDOMINAL HYSTERECTOMY W/ BILATERAL SALPINGOOPHORECTOMY      TUBAL LIGATION         Family History   Problem Relation Age of Onset    Ovarian cancer Mother     No Known Problems Brother     Pancreatic cancer Maternal Grandfather     Thyroid cancer Maternal Aunt     Liver cancer Maternal Uncle     Pancreatic cancer Maternal Uncle     No Known Problems Daughter     No Known Problems Maternal Grandmother     No Known Problems Paternal Grandmother     No Known Problems Paternal Grandfather     No Known Problems Son     No Known Problems Daughter     No Known Problems Maternal Uncle          Medications have been verified  Objective   /78   Pulse 80   Ht 5' 7" (1 702 m)   Wt 78 7 kg (173 lb 6 4 oz)   SpO2 98%   BMI 27 16 kg/m²        Physical Exam     Physical Exam  Vitals reviewed  Constitutional:       Appearance: Normal appearance  Cardiovascular:      Rate and Rhythm: Normal rate and regular rhythm  Pulses: Normal pulses  Heart sounds: Normal heart sounds  No murmur heard  Pulmonary:      Effort: Pulmonary effort is normal  No respiratory distress  Breath sounds: Normal breath sounds  Musculoskeletal:      Left knee: Swelling present  No erythema, lacerations or bony tenderness  Decreased range of motion  Tenderness present over the lateral joint line  No medial joint line tenderness  Normal alignment, normal meniscus and normal patellar mobility  Normal pulse  Instability Tests: Anterior drawer test negative  Posterior drawer test negative  Anterior Lachman test negative  Medial Hardik test negative and lateral Hardik test negative  Comments: - valgus/varus stress test   Skin:     General: Skin is warm and dry  Capillary Refill: Capillary refill takes less than 2 seconds  Neurological:      General: No focal deficit present  Mental Status: She is alert and oriented to person, place, and time     Psychiatric:         Mood and Affect: Mood normal  Behavior: Behavior normal

## 2022-09-27 ENCOUNTER — APPOINTMENT (OUTPATIENT)
Dept: RADIOLOGY | Facility: CLINIC | Age: 47
End: 2022-09-27
Payer: COMMERCIAL

## 2022-09-27 ENCOUNTER — OFFICE VISIT (OUTPATIENT)
Dept: OBGYN CLINIC | Facility: CLINIC | Age: 47
End: 2022-09-27
Payer: COMMERCIAL

## 2022-09-27 VITALS
WEIGHT: 173.6 LBS | HEIGHT: 67 IN | DIASTOLIC BLOOD PRESSURE: 84 MMHG | SYSTOLIC BLOOD PRESSURE: 128 MMHG | BODY MASS INDEX: 27.25 KG/M2 | HEART RATE: 85 BPM

## 2022-09-27 DIAGNOSIS — S83.92XA SPRAIN OF LEFT KNEE, UNSPECIFIED LIGAMENT, INITIAL ENCOUNTER: ICD-10-CM

## 2022-09-27 DIAGNOSIS — M25.462 EFFUSION OF LEFT KNEE: ICD-10-CM

## 2022-09-27 DIAGNOSIS — M23.92 ACUTE INTERNAL DERANGEMENT OF LEFT KNEE: Primary | ICD-10-CM

## 2022-09-27 DIAGNOSIS — M25.562 LEFT KNEE PAIN, UNSPECIFIED CHRONICITY: ICD-10-CM

## 2022-09-27 DIAGNOSIS — M25.562 ACUTE PAIN OF LEFT KNEE: ICD-10-CM

## 2022-09-27 PROCEDURE — 73564 X-RAY EXAM KNEE 4 OR MORE: CPT

## 2022-09-27 PROCEDURE — 73560 X-RAY EXAM OF KNEE 1 OR 2: CPT

## 2022-09-27 PROCEDURE — 99204 OFFICE O/P NEW MOD 45 MIN: CPT | Performed by: ORTHOPAEDIC SURGERY

## 2022-09-27 NOTE — PROGRESS NOTES
Patient Name:  Martha Childers  MRN:  [de-identified]    [facility]     1  Acute internal derangement of left knee  -     MRI knee left  wo contrast; Future; Expected date: 09/27/2022    2  Acute pain of left knee  -     XR knee 4+ vw left injury; Future; Expected date: 09/27/2022  -     XR knee 1 or 2 vw right; Future; Expected date: 09/27/2022    3  Sprain of left knee, unspecified ligament, initial encounter  -     Ambulatory Referral to Orthopedic Surgery    4  Effusion of left knee      52 y o  female with acute Left knee internal derangement status post traumatic event  X-rays reviewed in office today with patient  In light of patients recent injury, moderate knee effusion, positive special testing, pain with ambulation, will move forward with MRI of Left knee for evaluation of internal derangement, rule out meniscus or ligamentous injury including ACL injury, evaluate cartilage surfaces  In the meantime, advised patient to continue with gentle range of motion exercises, focusing on obtaining/maintianing knee extension with hamstring stretches as demonstrated in office  Patient may ambulate with crutches for support and safety until follow up in the office  She will follow up after MRI resulted for discussion of continued nonoperative vs surgical management pending MRI results  Work note provided to patient to stay out of work until follow up appointment  Chief Complaint     Left knee pain    History of the Present Illness     Martha Childers is a 52 y o  female with Left knee pain s/p rollerskating injury on 09/24/2022  Patient states she was helping her grandson, he lost his balance and the both fell  She tried to avoid falling on him and her leg was twisted and she landed on a flexed knee; patient heard a few pop sounds during the fall  Patient reported to Urgent care and was provided a brace for support   Today, patient reports the swelling has decreased, but she still notes pain with ambulation and attempt at knee flexion  She was provided knee brace, but was only using at home  She locates her pain to the medial aspect of the knee  She has been elevating and applying ice, but has not administered any OTC oral analgesics at this time  Review of Systems     Review of Systems   Constitutional: Negative for chills and fever  HENT: Negative for ear pain and sore throat  Eyes: Negative for pain and visual disturbance  Respiratory: Negative for cough and shortness of breath  Cardiovascular: Negative for chest pain and palpitations  Gastrointestinal: Negative for abdominal pain and vomiting  Genitourinary: Negative for dysuria and hematuria  Musculoskeletal: Negative for arthralgias and back pain  Skin: Negative for color change and rash  Neurological: Negative for seizures and syncope  All other systems reviewed and are negative  Physical Exam     /84   Pulse 85   Ht 5' 7" (1 702 m)   Wt 78 7 kg (173 lb 9 6 oz)   BMI 27 19 kg/m²     Left Knee  Range of motion from 0 to 85-90 degrees with anterior knee pain  There is no crepitus with range of motion  There is moderate effusion  There is tenderness over the anteromedial, medial joint line, mild tenderness over MCL origin and insertion  There is 4-/5 quadriceps strength and preserved tone  The patient is able to perform a straight leg raise  negative patellar grind test   Anterior drawer tests with mild anterior translation of proximal tibia as compared to contralateral lower extremity, elicits pain  Moderate guarding with Lachman Test, possibly mild increased anterior translation of proximal tibia as compared to contralateral lower extremity  Posterior drawer test is negative   Varus stress testing reveals no instability at 0 and 30 degrees   Valgus stress testing reveals no instability, but pain at 30 degrees  Hardik's testing is positive    The patient is neurovascular intact distally        Eyes: Anicteric sclerae  Neck:  Supple  Lungs:  Normal respiratory effort  Cardiovascular:  Capillary refill is less than 2 seconds  Skin:  Intact without erythema  Neurologic:  Sensation grossly intact to light touch  Psychiatric:  Mood and affect are appropriate  Data Review     I have personally reviewed pertinent films in PACS, and my interpretation follows:    X-rays taken 09/27/2022 of Left knee demonstrates minimal to mild medial compartment degenerative changes with well maintained lateral and patellofemoral degenerative changes  No acute fracture appreciated  Past Medical History:   Diagnosis Date    Disease of thyroid gland     Rheumatoid arthritis involving multiple sites ([facility] [address] ) 11/30/2020       Past Surgical History:   Procedure Laterality Date    HYSTERECTOMY      TOTAL ABDOMINAL HYSTERECTOMY W/ BILATERAL SALPINGOOPHORECTOMY      TUBAL LIGATION         Allergies   Allergen Reactions    Penicillins        Current Outpatient Medications on File Prior to Visit   Medication Sig Dispense Refill    hydroxychloroquine (PLAQUENIL) 200 mg tablet TAKE 2 TABLETS (400 MG TOTAL) BY MOUTH DAILY WITH BREAKFAST 180 tablet 1    meclizine (ANTIVERT) 25 mg tablet Take 1 tablet (25 mg total) by mouth 3 (three) times a day as needed for dizziness (Patient not taking: No sig reported) 30 tablet 0    nicotine (NICODERM CQ) 21 mg/24 hr TD 24 hr patch Place 1 patch on the skin every 24 hours (Patient not taking: No sig reported) 28 patch 2     No current facility-administered medications on file prior to visit         Social History     Tobacco Use    Smoking status: Current Every Day Smoker     Packs/day: 1 00     Years: 20 00     Pack years: 20 00     Types: Cigarettes    Smokeless tobacco: Never Used   Vaping Use    Vaping Use: Never used   Substance Use Topics    Alcohol use: Not Currently     Comment: rare    Drug use: No       Family History   Problem Relation Age of Onset    Ovarian cancer Mother    Teja Ellis No Known Problems Brother     Pancreatic cancer Maternal Grandfather     Thyroid cancer Maternal Aunt     Liver cancer Maternal Uncle     Pancreatic cancer Maternal Uncle     No Known Problems Daughter     No Known Problems Maternal Grandmother     No Known Problems Paternal Grandmother     No Known Problems Paternal Grandfather     No Known Problems Son     No Known Problems Daughter     No Known Problems Maternal Uncle              Procedures Performed     Procedures  None       Michelle Ray DO

## 2022-09-27 NOTE — LETTER
September 27, 2022     Patient: Juanjo Lee  YOB: 1975  Date of Visit: 9/27/2022      To Whom it May Concern:    Aki Servin is under my professional care  [facility] was seen in my office on 9/27/2022  Please excuse [facility] from work at this time  She will follow up in office after MRI of Left knee resulted  If you have any questions or concerns, please don't hesitate to call           Sincerely,          Lizy Norton, DO

## 2022-10-05 ENCOUNTER — TELEPHONE (OUTPATIENT)
Dept: OBGYN CLINIC | Facility: CLINIC | Age: 47
End: 2022-10-05

## 2022-10-05 NOTE — TELEPHONE ENCOUNTER
Spoke with patient her MRI was canceled due to auth still pending     I called and checked on auth I has now been approved     Her new MRI appointment is scheduled for 10/13/22 at 3:45 and her new appt with Dr Vera Lombardi is on 10/17/22

## 2022-10-13 ENCOUNTER — HOSPITAL ENCOUNTER (OUTPATIENT)
Dept: MRI IMAGING | Facility: HOSPITAL | Age: 47
End: 2022-10-13
Payer: COMMERCIAL

## 2022-10-13 DIAGNOSIS — M23.92 ACUTE INTERNAL DERANGEMENT OF LEFT KNEE: ICD-10-CM

## 2022-10-13 PROCEDURE — 73721 MRI JNT OF LWR EXTRE W/O DYE: CPT

## 2022-10-17 ENCOUNTER — OFFICE VISIT (OUTPATIENT)
Dept: OBGYN CLINIC | Facility: CLINIC | Age: 47
End: 2022-10-17
Payer: COMMERCIAL

## 2022-10-17 VITALS
SYSTOLIC BLOOD PRESSURE: 113 MMHG | WEIGHT: 173.8 LBS | DIASTOLIC BLOOD PRESSURE: 77 MMHG | BODY MASS INDEX: 27.28 KG/M2 | HEIGHT: 67 IN | HEART RATE: 86 BPM

## 2022-10-17 DIAGNOSIS — M25.562 ACUTE PAIN OF LEFT KNEE: ICD-10-CM

## 2022-10-17 DIAGNOSIS — M84.469D INSUFFICIENCY FRACTURE OF TIBIA WITH ROUTINE HEALING, SUBSEQUENT ENCOUNTER: Primary | ICD-10-CM

## 2022-10-17 PROCEDURE — 99214 OFFICE O/P EST MOD 30 MIN: CPT | Performed by: ORTHOPAEDIC SURGERY

## 2022-10-17 NOTE — PROGRESS NOTES
Patient Name:  Radha Beckman  MRN:  [[id number]]    [address]     1  Insufficiency fracture of tibia with routine healing, subsequent encounter    2  Acute pain of left knee        52 y o  female with Left knee medial tibial plateau subchondral fracture and contusion  MRI reviewed in office today with patient  At this time, strongly advised patient to decrease excessive ambulation throughout her work day and while at home, no kneeling, high impact activities, pivoting, twisting  Also discussed benefit of medial  brace to use while at work and out of the house to decrease stress over medial tibial plateau  Also provided patient with PT script to work on range of motion and strengthening  Patient verbalized understanding of the above and will follow up in office in 8 weeks for reevaluation  History of the Present Illness   Radha Beckman is a 52 y o  female with Left knee internal derangement  Today, patient reports she is doing well s/p rollerskating injury, but just a little sore  She did have to go to work where she walks all day and does note an increase of pain  She does admit bending her knee causes increased pain and feels stiff  Review of Systems     Review of Systems   Constitutional: Negative for chills and fever  HENT: Negative for congestion  Respiratory: Negative for cough, chest tightness and shortness of breath  Cardiovascular: Negative for chest pain and palpitations  Gastrointestinal: Negative for abdominal pain  Endocrine: Negative for cold intolerance and heat intolerance  Neurological: Negative for syncope  Psychiatric/Behavioral: Negative for confusion  Physical Exam     /77   Pulse 86   Ht 5' 7" (1 702 m)   Wt 78 8 kg (173 lb 12 8 oz)   BMI 27 22 kg/m²     Left Knee  Range of motion from 0 to 90  There is trace effusion  There is tenderness over the proximal medial tibia, posterior medial tibia      The patient is able to perform a straight leg raise  Varus stress testing reveals no instability at 0 and 30 degrees   Valgus stress testing reveals no instability at 0 and 30 degrees  The patient is neurovascular intact distally  Data Review     I have personally reviewed pertinent films in PACS, and my interpretation follows  X-rays taken 09/27/2022 of Left knee demonstrates minimal to mild medial compartment degenerative changes with well maintained lateral and patellofemoral degenerative changes  No acute fracture appreciated  MRI performed 10/13/2022 of Left knee demonstrates subchondral fracture at posterior medial tibial plateau  No evidence of ligamentous injury or meniscus tear       Social History     Tobacco Use   • Smoking status: Current Every Day Smoker     Packs/day: 1 00     Years: 20 00     Pack years: 20 00     Types: Cigarettes   • Smokeless tobacco: Never Used   Vaping Use   • Vaping Use: Never used   Substance Use Topics   • Alcohol use: Not Currently     Comment: rare   • Drug use: No           Procedures  None     Sonal Matute PA-C

## 2022-11-03 ENCOUNTER — TELEPHONE (OUTPATIENT)
Dept: OBGYN CLINIC | Facility: HOSPITAL | Age: 47
End: 2022-11-03

## 2022-11-03 DIAGNOSIS — M05.79 RHEUMATOID ARTHRITIS INVOLVING MULTIPLE SITES WITH POSITIVE RHEUMATOID FACTOR (HCC): ICD-10-CM

## 2022-11-03 RX ORDER — HYDROXYCHLOROQUINE SULFATE 200 MG/1
400 TABLET, FILM COATED ORAL
Qty: 180 TABLET | Refills: 0 | Status: SHIPPED | OUTPATIENT
Start: 2022-11-03 | End: 2022-11-06 | Stop reason: SDUPTHER

## 2022-11-03 NOTE — TELEPHONE ENCOUNTER
Caller: patient    Doctor: Barrett Ruiz    Reason for call: Patient called stating she is to schedule a follow up to get a refill  First opening is next year and she will be out of the medication      Call back#: [phone]

## 2022-11-06 RX ORDER — HYDROXYCHLOROQUINE SULFATE 200 MG/1
400 TABLET, FILM COATED ORAL
Qty: 180 TABLET | Refills: 0 | Status: SHIPPED | OUTPATIENT
Start: 2022-11-06 | End: 2023-02-04

## 2022-11-08 NOTE — PROGRESS NOTES
Assessment and Plan:   Ms Nina Reeves a 59-year-old female with history significant for seropositive rheumatoid arthritis and positive hepatitis-B surface antigen who presents for a follow-up  Frederick Lee is currently on hydroxychloroquine 400 mg once daily      # Seropositive rheumatoid arthritis  - Frandy presents today for a follow-up of seropositive rheumatoid arthritis and is currently on hydroxychloroquine 400 mg once daily   While she was undergoing screening tests for initiation of DMARD therapy she was found to have a positive hepatitis B surface antigen and elevated HBV DNA viral load   In view of this we opted to start her on hydroxychloroquine        - She reports overall she has been doing well on the hydroxychloroquine and is denying any significant arthralgias  Her physical examination is also unrevealing today  I feel like overall her rheumatoid arthritis is in remission  In view of this we will continue with the hydroxychloroquine 400 mg once daily, and I strongly encouraged her to establish with ophthalmology for her baseline and annual eye exam   At this time there is no indication to step up DMARDs  She can take Tylenol or NSAIDs as needed      - I advised her to continue monitoring for worsening joint pains or additional joint involvement which may indicate the need to step up DMARD therapy  Jack Trujillo to doing so she will need to follow up with Gastroenterology to be started on antiviral therapy for the chronic hepatitis-B   For now I will see her back in the office in 1 year but advised her to call with any concerns or flare-up in symptoms  Plan:  Diagnoses and all orders for this visit:    Seropositive rheumatoid arthritis ([address] )    Long-term use of Plaquenil      Activities as tolerated    Continue other medications as prescribed by PCP and other specialists        RTC in 1 year          HPI    INITIAL VISIT NOTE:  Ms Fox is a 45-year-old female with no significant past medical history, who presents for further evaluation of diffuse joint pains and a positive rheumatoid factor at a titer of 80   She is referred by Dr Magui Zelaya      Patient reports she has been experiencing diffuse joint pains over the past 2-3 years, but states in the past 1 year she has noticed worsening pain affecting her bilateral hands and feet   She says the symptoms have been gradually progressive over time, and affect her on a daily basis in a mostly constant manner   She does also intermittently experience pain at her wrists, shoulders, hips, knees and ankles, but these joint pains affect her more intermittently   She has noticed swelling affecting her bilateral hands and wrists, and does present with swelling of her left wrist today   She also experiences morning stiffness which affects her diffusely, including her hands, and states it lasts at least 1 hour but on occasion can persist throughout the day   She has not tried taking any over-the-counter pain medications  Carla Gonzalez has custody of her 3 grand children, and states repetitive hand movements required while caring for them (such as buttoning) really aggravates her symptoms  She was seen by her primary care physician in view of these complaints and had additional testing done which revealed a positive rheumatoid factor at a titer of 80   CBC, CMP, DINA screen, Lyme antibody profile, ESR and TSH were unremarkable   She has not had any hand x-rays done      She also reports a history of dry skin affecting her palms, but states this has currently resolved with the use of moisturizers   She otherwise denies any other skin rash or history of psoriasis   No family history of autoimmune disease         5/23/2019:  Patient presents for follow-up of seropositive rheumatoid arthritis  Carla Gonzalez is currently on hydroxychloroquine 200 mg twice daily that was started in March 2019      She continues to report pain predominantly affecting her hands, feet, back and knees   She states the pain is most prominent in the morning but resolves during the day   She states the wrist swelling has resolved   On occasion she will still notice swelling affecting her fingers   She has also been noticing over the past few weeks an intense pain associated with swelling affecting the dorsum of her right foot, which will cause a spasm of her toes   This occurs and remits spontaneously  Ashly Rob is otherwise no joint swelling noted   She experiences morning stiffness affecting the same joints which lasts 1-2 hours  Su Augustine is currently not taking any over-the-counter pain medications   She has been tolerating the hydroxychloroquine well      She was seen by Gastroenterology following our prior office visit in view of the positive hepatitis B surface antigen  Su Augustine was also found to have a positive viral load   Due to normal LFTs, and patient being asymptomatic, there was no indication to start her on antivirals unless there are plans for immunosuppressive therapy for the rheumatoid arthritis   In view of this she was started on hydroxychloroquine as opposed to other DMARDs  Su Augustine will continue follow-up with Gastroenterology         9/3/2019:  Patient presents for a follow-up of seropositive rheumatoid arthritis  Su Augustine is currently on hydroxychloroquine 200 mg twice daily      She reports with the hydroxychloroquine there has been an improvement in the bilateral wrist pain, and only on occasion will she experience pain going through her fingers   Her main complaint at this time is pain predominantly affecting her bilateral thumbs and index fingers at the IP and DIP joints, respectively   She is denying any other significant joint pains, except for ongoing low back pain   She notices occasional swelling affecting her fingers   She does experience morning stiffness which mostly affects her hands and lasts about 30 minutes   On occasion she will apply the diclofenac gel which does help her      She has otherwise been tolerating the hydroxychloroquine well, and it seems like she is up-to-date with her eye exams   No other complaints noted at this time      Of note she is continuing to follow-up with gastroenterology for the positive hepatitis B viral load, but there are no plans to start her on treatment unless we advance with rheumatoid arthritis treatment         1/29/2020:  Patient presents for a follow-up of seropositive rheumatoid arthritis  She is currently on hydroxychloroquine 200 mg twice daily      She reports overall she is doing well  She will still on occasion experience pain affecting her left hand PIP joints, as well as her shoulders and low back region  She denies any significant pain of her right hand, wrists, elbows, hips, knees, ankles or feet  She denies any swollen joints  She does experience morning stiffness which can affect her hands but improves within about 20 minutes  She is not taking any Tylenol or over-the-counter NSAIDs for her symptoms  She has been tolerating the hydroxychloroquine well without any concerns for side effects  She will be due for an annual eye exam later this year      She continues to follow-up with Gastroenterology periodically for the chronic hepatitis B infection  7/29/2020:  Patient presents for a follow-up of seropositive rheumatoid arthritis  She is currently on hydroxychloroquine 200 mg twice daily  She reports overall she is doing well and only experiences intermittent joint pains of her hands  She denies any other joint pain or swelling  She does experience mild morning stiffness of her bilateral hands which improves with activities  She is not taking any over-the-counter pain medications  If required she will use the Voltaren Gel topically  She reports usually during the summer months her joint pains are better but she may experience a flare-up during the winter  11/9/2022:  Patient presents for a follow-up of seropositive rheumatoid arthritis    She is currently on hydroxychloroquine 400 mg once daily  She reports she has been doing well and denies any joint pains, swelling or stiffness  Of note she is not up-to-date with her eye exams and is unsure if she even had a baseline eye exam done  The following portions of the patient's history were reviewed and updated as appropriate: allergies, current medications, past family history, past medical history, past social history, past surgical history and problem list       Review of Systems  Constitutional: Negative for weight change, fevers, chills, night sweats, fatigue  ENT/Mouth: Negative for hearing changes, ear pain, nasal congestion, sinus pain, hoarseness, sore throat, rhinorrhea, swallowing difficulty  Eyes: Negative for pain, redness, discharge, vision changes  Cardiovascular: Negative for chest pain, SOB, palpitations  Respiratory: Negative for cough, sputum, wheezing, dyspnea  Gastrointestinal: Negative for nausea, vomiting, diarrhea, constipation, pain, heartburn  Genitourinary: Negative for dysuria, urinary frequency, hematuria  Musculoskeletal: As per HPI  Skin: Negative for skin rash, color changes  Neuro: Negative for weakness, numbness, tingling, loss of consciousness  Psych: Negative for anxiety, depression  Heme/Lymph: Negative for easy bruising, bleeding, lymphadenopathy          Past Medical History:   Diagnosis Date   • Disease of thyroid gland    • Rheumatoid arthritis involving multiple sites ([facility] ) 11/30/2020       Past Surgical History:   Procedure Laterality Date   • HYSTERECTOMY     • TOTAL ABDOMINAL HYSTERECTOMY W/ BILATERAL SALPINGOOPHORECTOMY     • TUBAL LIGATION         Social History     Socioeconomic History   • Marital status: /Civil Union     Spouse name: Not on file   • Number of children: 3   • Years of education: Not on file   • Highest education level: Not on file   Occupational History     Employer: [employer]   Tobacco Use   • Smoking status: Current Every Day Smoker     Packs/day: 1 00     Years: 20 00     Pack years: 20 00     Types: Cigarettes   • Smokeless tobacco: Never Used   Vaping Use   • Vaping Use: Never used   Substance and Sexual Activity   • Alcohol use: Not Currently     Comment: rare   • Drug use: No   • Sexual activity: Yes   Other Topics Concern   • Not on file   Social History Narrative    Daily coffee consumption    Uses seatbelts     Social Determinants of Health     Financial Resource Strain: Not on file   Food Insecurity: Not on file   Transportation Needs: Not on file   Physical Activity: Not on file   Stress: Not on file   Social Connections: Not on file   Intimate Partner Violence: Not on file   Housing Stability: Not on file       Family History   Problem Relation Age of Onset   • Ovarian cancer Mother    • No Known Problems Brother    • Pancreatic cancer Maternal Grandfather    • Thyroid cancer Maternal Aunt    • Liver cancer Maternal Uncle    • Pancreatic cancer Maternal Uncle    • No Known Problems Daughter    • No Known Problems Maternal Grandmother    • No Known Problems Paternal Grandmother    • No Known Problems Paternal Grandfather    • No Known Problems Son    • No Known Problems Daughter    • No Known Problems Maternal Uncle        Allergies   Allergen Reactions   • Penicillins        Current Outpatient Medications:   •  hydroxychloroquine (PLAQUENIL) 200 mg tablet, Take 2 tablets (400 mg total) by mouth daily with breakfast, Disp: 180 tablet, Rfl: 0  •  nicotine (NICODERM CQ) 21 mg/24 hr TD 24 hr patch, Place 1 patch on the skin every 24 hours (Patient not taking: No sig reported), Disp: 28 patch, Rfl: 2      Objective:    Vitals:    11/09/22 1521   BP: 102/80   Pulse: 79       Physical Exam  General: Well appearing, well nourished, in no distress  Oriented x 3, normal mood and affect  Ambulating without difficulty  Skin: Good turgor, no rash, unusual bruising or prominent lesions    Hair: Normal texture and distribution  Nails: Normal color, no deformities  HEENT:  Head: Normocephalic, atraumatic  Eyes: Conjunctiva clear, sclera non-icteric, EOM intact  Extremities: No amputations or deformities, cyanosis, edema  Musculoskeletal:   Hands, wrists, elbows and shoulders - there is no tenderness or soft tissue swelling noted  Neurologic: Alert and oriented  No focal neurological deficits appreciated  Psychiatric: Normal mood and affect  WISAM vAilez    Rheumatology

## 2022-11-09 ENCOUNTER — OFFICE VISIT (OUTPATIENT)
Dept: RHEUMATOLOGY | Facility: CLINIC | Age: 47
End: 2022-11-09

## 2022-11-09 VITALS — DIASTOLIC BLOOD PRESSURE: 80 MMHG | HEART RATE: 79 BPM | SYSTOLIC BLOOD PRESSURE: 102 MMHG

## 2022-11-09 DIAGNOSIS — M05.9 SEROPOSITIVE RHEUMATOID ARTHRITIS (HCC): Primary | ICD-10-CM

## 2022-11-09 DIAGNOSIS — Z79.899 LONG-TERM USE OF PLAQUENIL: ICD-10-CM

## 2023-02-01 DIAGNOSIS — M05.79 RHEUMATOID ARTHRITIS INVOLVING MULTIPLE SITES WITH POSITIVE RHEUMATOID FACTOR (HCC): ICD-10-CM

## 2023-02-01 RX ORDER — HYDROXYCHLOROQUINE SULFATE 200 MG/1
400 TABLET, FILM COATED ORAL
Qty: 180 TABLET | Refills: 0 | Status: SHIPPED | OUTPATIENT
Start: 2023-02-01 | End: 2023-05-02

## 2023-02-17 ENCOUNTER — HOSPITAL ENCOUNTER (OUTPATIENT)
Dept: RADIOLOGY | Facility: MEDICAL CENTER | Age: 48
Discharge: HOME | End: 2023-02-17

## 2023-02-17 VITALS — BODY MASS INDEX: 27.15 KG/M2 | HEIGHT: 67 IN | WEIGHT: 173 LBS

## 2023-02-17 DIAGNOSIS — Z12.31 ENCOUNTER FOR SCREENING MAMMOGRAM FOR MALIGNANT NEOPLASM OF BREAST: ICD-10-CM

## 2023-04-28 NOTE — TELEPHONE ENCOUNTER
It is reasonable to postpone follow up appointment, however I would recommend she follow up at least yearly in the office so she can follow up in June or July  No

## 2023-05-04 DIAGNOSIS — M05.79 RHEUMATOID ARTHRITIS INVOLVING MULTIPLE SITES WITH POSITIVE RHEUMATOID FACTOR (HCC): ICD-10-CM

## 2023-05-04 RX ORDER — HYDROXYCHLOROQUINE SULFATE 200 MG/1
400 TABLET, FILM COATED ORAL
Qty: 180 TABLET | Refills: 0 | Status: SHIPPED | OUTPATIENT
Start: 2023-05-04 | End: 2023-08-02

## 2023-08-12 DIAGNOSIS — M05.79 RHEUMATOID ARTHRITIS INVOLVING MULTIPLE SITES WITH POSITIVE RHEUMATOID FACTOR (HCC): ICD-10-CM

## 2023-08-12 RX ORDER — HYDROXYCHLOROQUINE SULFATE 200 MG/1
400 TABLET, FILM COATED ORAL
Qty: 180 TABLET | Refills: 0 | Status: SHIPPED | OUTPATIENT
Start: 2023-08-12 | End: 2023-11-10

## 2023-11-06 NOTE — PROGRESS NOTES
Assessment and Plan:   Ms. Edith Duffy is a 55-year-old female with history significant for seropositive rheumatoid arthritis and chronic hepatitis B infection who presents for a follow-up. She is currently on hydroxychloroquine 400 mg once daily. # Seropositive rheumatoid arthritis  - Jaron Yeboah presents today for a follow-up of seropositive rheumatoid arthritis and is currently on hydroxychloroquine 400 mg once daily. While she was undergoing screening tests for initiation of DMARD therapy she was found to have a positive hepatitis B surface antigen and elevated HBV DNA viral load. In view of this we opted to start her on hydroxychloroquine. - She reports overall through the year she has been fairly stable on the hydroxychloroquine but in the past few weeks possibly related to a change in the weather she has been experiencing increased hand pain and stiffness as well as new onset of pain in the left hip region. Her symptoms may be secondary to the underlying rheumatoid arthritis but as she has no medical insurance at this time and is hopeful to obtain this in January we discussed holding off on any specific treatment changes at this time. She will continue the hydroxychloroquine unchanged and I will trial her on meloxicam 15 mg once a day as needed. She will obtain an x-ray of her left hip and update high risk medication lab monitoring in January 2024 after obtaining insurance. I also encouraged her to follow-up with ophthalmology for her annual eye exam.    # Chronic hepatitis B infection   - If a step up in DMARDS is required prior to doing so she will need to follow up with Gastroenterology to be started on antiviral therapy for the chronic hepatitis-B. Plan:  Diagnoses and all orders for this visit:    Seropositive rheumatoid arthritis ([address])  -     C-reactive protein; Future  -     Sedimentation rate, automated; Future  -     meloxicam (Mobic) 15 mg tablet;  Take 1 tablet (15 mg total) by mouth daily as needed for moderate pain  -     hydroxychloroquine (PLAQUENIL) 200 mg tablet; Take 2 tablets (400 mg total) by mouth daily with breakfast  -     XR hip/pelv 2-3 vws left if performed; Future    Long-term use of Plaquenil  -     CBC and differential; Future  -     Comprehensive metabolic panel; Future    Left hip pain  -     meloxicam (Mobic) 15 mg tablet; Take 1 tablet (15 mg total) by mouth daily as needed for moderate pain  -     XR hip/pelv 2-3 vws left if performed; Future    Chronic hepatitis B ([address])      Activities as tolerated. Continue other medications as prescribed by PCP and other specialists. RTC in 6 months. HPI    INITIAL VISIT NOTE:  Ms. Chato Corey is a 49-year-old female with no significant past medical history, who presents for further evaluation of diffuse joint pains and a positive rheumatoid factor at a titer of 80. She is referred by Dr. Alonzo Escobar. Patient reports she has been experiencing diffuse joint pains over the past 2-3 years, but states in the past 1 year she has noticed worsening pain affecting her bilateral hands and feet. She says the symptoms have been gradually progressive over time, and affect her on a daily basis in a mostly constant manner. She does also intermittently experience pain at her wrists, shoulders, hips, knees and ankles, but these joint pains affect her more intermittently. She has noticed swelling affecting her bilateral hands and wrists, and does present with swelling of her left wrist today. She also experiences morning stiffness which affects her diffusely, including her hands, and states it lasts at least 1 hour but on occasion can persist throughout the day. She has not tried taking any over-the-counter pain medications.   She has custody of her 3 grand children, and states repetitive hand movements required while caring for them (such as buttoning) really aggravates her symptoms  She was seen by her primary care physician in view of these complaints and had additional testing done which revealed a positive rheumatoid factor at a titer of 80. CBC, CMP, DINA screen, Lyme antibody profile, ESR and TSH were unremarkable. She has not had any hand x-rays done. She also reports a history of dry skin affecting her palms, but states this has currently resolved with the use of moisturizers. She otherwise denies any other skin rash or history of psoriasis. No family history of autoimmune disease. 5/23/2019:  Patient presents for follow-up of seropositive rheumatoid arthritis. She is currently on hydroxychloroquine 200 mg twice daily that was started in March 2019. She continues to report pain predominantly affecting her hands, feet, back and knees. She states the pain is most prominent in the morning but resolves during the day. She states the wrist swelling has resolved. On occasion she will still notice swelling affecting her fingers. She has also been noticing over the past few weeks an intense pain associated with swelling affecting the dorsum of her right foot, which will cause a spasm of her toes. This occurs and remits spontaneously. There is otherwise no joint swelling noted. She experiences morning stiffness affecting the same joints which lasts 1-2 hours. She is currently not taking any over-the-counter pain medications. She has been tolerating the hydroxychloroquine well. She was seen by Gastroenterology following our prior office visit in view of the positive hepatitis B surface antigen. She was also found to have a positive viral load. Due to normal LFTs, and patient being asymptomatic, there was no indication to start her on antivirals unless there are plans for immunosuppressive therapy for the rheumatoid arthritis. In view of this she was started on hydroxychloroquine as opposed to other DMARDs. She will continue follow-up with Gastroenterology.         9/3/2019:  Patient presents for a follow-up of seropositive rheumatoid arthritis. She is currently on hydroxychloroquine 200 mg twice daily. She reports with the hydroxychloroquine there has been an improvement in the bilateral wrist pain, and only on occasion will she experience pain going through her fingers. Her main complaint at this time is pain predominantly affecting her bilateral thumbs and index fingers at the IP and DIP joints, respectively. She is denying any other significant joint pains, except for ongoing low back pain. She notices occasional swelling affecting her fingers. She does experience morning stiffness which mostly affects her hands and lasts about 30 minutes. On occasion she will apply the diclofenac gel which does help her. She has otherwise been tolerating the hydroxychloroquine well, and it seems like she is up-to-date with her eye exams. No other complaints noted at this time. Of note she is continuing to follow-up with gastroenterology for the positive hepatitis B viral load, but there are no plans to start her on treatment unless we advance with rheumatoid arthritis treatment. 1/29/2020:  Patient presents for a follow-up of seropositive rheumatoid arthritis. She is currently on hydroxychloroquine 200 mg twice daily. She reports overall she is doing well. She will still on occasion experience pain affecting her left hand PIP joints, as well as her shoulders and low back region. She denies any significant pain of her right hand, wrists, elbows, hips, knees, ankles or feet. She denies any swollen joints. She does experience morning stiffness which can affect her hands but improves within about 20 minutes. She is not taking any Tylenol or over-the-counter NSAIDs for her symptoms. She has been tolerating the hydroxychloroquine well without any concerns for side effects. She will be due for an annual eye exam later this year.      She continues to follow-up with Gastroenterology periodically for the chronic hepatitis B infection. 7/29/2020:  Patient presents for a follow-up of seropositive rheumatoid arthritis. She is currently on hydroxychloroquine 200 mg twice daily. She reports overall she is doing well and only experiences intermittent joint pains of her hands. She denies any other joint pain or swelling. She does experience mild morning stiffness of her bilateral hands which improves with activities. She is not taking any over-the-counter pain medications. If required she will use the Voltaren Gel topically. She reports usually during the summer months her joint pains are better but she may experience a flare-up during the winter. 11/9/2022:  Patient presents for a follow-up of seropositive rheumatoid arthritis. She is currently on hydroxychloroquine 400 mg once daily. She reports she has been doing well and denies any joint pains, swelling or stiffness. Of note she is not up-to-date with her eye exams and is unsure if she even had a baseline eye exam done. 11/8/2023:  Patient presents for a follow-up of seropositive rheumatoid arthritis. She is currently on hydroxychloroquine 400 mg once daily. Patient reports throughout the year she had actually been doing fairly well but in the past few weeks possibly related to a change in weather she has been experiencing more pain affecting her hands but has also noticed pain in the upper groin region of her left hip associated with difficulty raising her leg. No other concerning joint pains. She has noticed swelling to affect her fingers. She experiences morning stiffness affecting her hands that can take a few hours to improve. She tries to avoid taking any over-the-counter pain medications given the history of chronic hepatitis B. Of note she is not up-to-date with her eye exams and has not seen gastroenterology since 2020.     The following portions of the patient's history were reviewed and updated as appropriate: allergies, current medications, past family history, past medical history, past social history, past surgical history and problem list.      Review of Systems  Constitutional: Negative for weight change, fevers, chills, night sweats, fatigue. ENT/Mouth: Negative for hearing changes, ear pain, nasal congestion, sinus pain, hoarseness, sore throat, rhinorrhea, swallowing difficulty. Eyes: Negative for pain, redness, discharge, vision changes. Cardiovascular: Negative for chest pain, SOB, palpitations. Respiratory: Negative for cough, sputum, wheezing, dyspnea. Gastrointestinal: Negative for nausea, vomiting, diarrhea, constipation, pain, heartburn. Genitourinary: Negative for dysuria, urinary frequency, hematuria. Musculoskeletal: As per HPI. Skin: Negative for skin rash, color changes. Neuro: Negative for weakness, numbness, tingling, loss of consciousness. Psych: Negative for anxiety, depression. Heme/Lymph: Negative for easy bruising, bleeding, lymphadenopathy.         Past Medical History:   Diagnosis Date    Disease of thyroid gland     Rheumatoid arthritis involving multiple sites ([address] [facility]) 11/30/2020       Past Surgical History:   Procedure Laterality Date    HYSTERECTOMY      TOTAL ABDOMINAL HYSTERECTOMY W/ BILATERAL SALPINGOOPHORECTOMY      TUBAL LIGATION         Social History     Socioeconomic History    Marital status: /Civil Union     Spouse name: Not on file    Number of children: 3    Years of education: Not on file    Highest education level: Not on file   Occupational History     Employer: [employer]   Tobacco Use    Smoking status: Every Day     Packs/day: 1.00     Years: 20.00     Total pack years: 20.00     Types: Cigarettes    Smokeless tobacco: Never   Vaping Use    Vaping Use: Never used   Substance and Sexual Activity    Alcohol use: Not Currently     Comment: rare    Drug use: No    Sexual activity: Yes   Other Topics Concern    Not on file   Social History Narrative    Daily coffee consumption    Uses seatbelts     Social Determinants of Health     Financial Resource Strain: Not on file   Food Insecurity: Not on file   Transportation Needs: Not on file   Physical Activity: Not on file   Stress: Not on file   Social Connections: Not on file   Intimate Partner Violence: Not on file   Housing Stability: Not on file       Family History   Problem Relation Age of Onset    Ovarian cancer Mother     No Known Problems Brother     Pancreatic cancer Maternal Grandfather     Thyroid cancer Maternal Aunt     Liver cancer Maternal Uncle     Pancreatic cancer Maternal Uncle     No Known Problems Daughter     No Known Problems Maternal Grandmother     No Known Problems Paternal Grandmother     No Known Problems Paternal Grandfather     No Known Problems Son     No Known Problems Daughter     No Known Problems Maternal Uncle        Allergies   Allergen Reactions    Penicillins        Current Outpatient Medications:     hydroxychloroquine (PLAQUENIL) 200 mg tablet, Take 2 tablets (400 mg total) by mouth daily with breakfast, Disp: 180 tablet, Rfl: 1    meloxicam (Mobic) 15 mg tablet, Take 1 tablet (15 mg total) by mouth daily as needed for moderate pain, Disp: 7 tablet, Rfl: 0    nicotine (NICODERM CQ) 21 mg/24 hr TD 24 hr patch, Place 1 patch on the skin every 24 hours, Disp: 28 patch, Rfl: 2      Objective:    Vitals:    11/08/23 1453   BP: 122/80   Weight: 84.4 kg (186 lb)   Height: 5' 7" (1.702 m)       Physical Exam  General: Well appearing, well nourished, in no distress. Oriented x 3, normal mood and affect. Ambulating without difficulty. Skin: Good turgor, no rash, unusual bruising or prominent lesions. Hair: Normal texture and distribution. Nails: Normal color, no deformities. HEENT:  Head: Normocephalic, atraumatic. Eyes: Conjunctiva clear, sclera non-icteric, EOM intact. Extremities: No amputations or deformities, cyanosis, edema.   Musculoskeletal:   Hands - there is soft tissue swelling noted at the bilateral 3rd PIP joints without significant tenderness. The MCPs are unremarkable. Left hip - pain noted to direct pressure of the hip joint. Neurologic: Alert and oriented. No focal neurological deficits appreciated. Psychiatric: Normal mood and affect. Magnolia Roblero M.D.   Rheumatology

## 2023-11-08 ENCOUNTER — OFFICE VISIT (OUTPATIENT)
Dept: RHEUMATOLOGY | Facility: CLINIC | Age: 48
End: 2023-11-08

## 2023-11-08 VITALS
SYSTOLIC BLOOD PRESSURE: 122 MMHG | BODY MASS INDEX: 29.19 KG/M2 | WEIGHT: 186 LBS | HEIGHT: 67 IN | DIASTOLIC BLOOD PRESSURE: 80 MMHG

## 2023-11-08 DIAGNOSIS — M25.552 LEFT HIP PAIN: ICD-10-CM

## 2023-11-08 DIAGNOSIS — M05.9 SEROPOSITIVE RHEUMATOID ARTHRITIS (HCC): Primary | ICD-10-CM

## 2023-11-08 DIAGNOSIS — Z79.899 LONG-TERM USE OF PLAQUENIL: ICD-10-CM

## 2023-11-08 DIAGNOSIS — B18.1 CHRONIC HEPATITIS B (HCC): ICD-10-CM

## 2023-11-08 PROCEDURE — 99214 OFFICE O/P EST MOD 30 MIN: CPT | Performed by: INTERNAL MEDICINE

## 2023-11-08 RX ORDER — HYDROXYCHLOROQUINE SULFATE 200 MG/1
400 TABLET, FILM COATED ORAL
Qty: 180 TABLET | Refills: 1 | Status: SHIPPED | OUTPATIENT
Start: 2023-11-08 | End: 2024-05-06

## 2023-11-08 RX ORDER — MELOXICAM 15 MG/1
15 TABLET ORAL DAILY PRN
Qty: 7 TABLET | Refills: 0 | Status: SHIPPED | OUTPATIENT
Start: 2023-11-08

## 2024-03-18 ENCOUNTER — TELEPHONE (OUTPATIENT)
Dept: FAMILY MEDICINE CLINIC | Facility: CLINIC | Age: 49
End: 2024-03-18

## 2024-05-09 DIAGNOSIS — Z00.6 ENCOUNTER FOR EXAMINATION FOR NORMAL COMPARISON OR CONTROL IN CLINICAL RESEARCH PROGRAM: ICD-10-CM

## 2024-05-10 NOTE — TELEPHONE ENCOUNTER
Called patient to discuss patient did not answer LVM for patient to please call me back at [phone] None

## 2024-05-16 ENCOUNTER — APPOINTMENT (OUTPATIENT)
Dept: LAB | Facility: MEDICAL CENTER | Age: 49
End: 2024-05-16

## 2024-05-16 DIAGNOSIS — Z00.6 ENCOUNTER FOR EXAMINATION FOR NORMAL COMPARISON OR CONTROL IN CLINICAL RESEARCH PROGRAM: ICD-10-CM

## 2024-05-16 DIAGNOSIS — M05.9 SEROPOSITIVE RHEUMATOID ARTHRITIS (HCC): ICD-10-CM

## 2024-05-16 PROCEDURE — 36415 COLL VENOUS BLD VENIPUNCTURE: CPT

## 2024-05-16 RX ORDER — HYDROXYCHLOROQUINE SULFATE 200 MG/1
400 TABLET, FILM COATED ORAL
Qty: 180 TABLET | Refills: 1 | Status: SHIPPED | OUTPATIENT
Start: 2024-05-16 | End: 2024-11-12

## 2024-05-22 ENCOUNTER — TELEPHONE (OUTPATIENT)
Age: 49
End: 2024-05-22

## 2024-05-22 ENCOUNTER — TELEMEDICINE (OUTPATIENT)
Dept: RHEUMATOLOGY | Facility: CLINIC | Age: 49
End: 2024-05-22
Payer: COMMERCIAL

## 2024-05-22 ENCOUNTER — TELEPHONE (OUTPATIENT)
Dept: RHEUMATOLOGY | Facility: CLINIC | Age: 49
End: 2024-05-22

## 2024-05-22 DIAGNOSIS — Z79.899 LONG-TERM USE OF PLAQUENIL: ICD-10-CM

## 2024-05-22 DIAGNOSIS — M05.9 SEROPOSITIVE RHEUMATOID ARTHRITIS (HCC): Primary | ICD-10-CM

## 2024-05-22 DIAGNOSIS — B18.1 CHRONIC HEPATITIS B (HCC): ICD-10-CM

## 2024-05-22 PROCEDURE — 99214 OFFICE O/P EST MOD 30 MIN: CPT | Performed by: INTERNAL MEDICINE

## 2024-05-22 NOTE — TELEPHONE ENCOUNTER
Patient called in stating that she just had a virtual visit with Dr. Swanson and is going home now to enter her insurance information through [employer]. Patient asked to please hold off on billing her until this is complete.

## 2024-05-22 NOTE — PROGRESS NOTES
Virtual Regular Visit    Verification of patient location:    Patient is located at Other in the following state in which I hold an active license [place]      Assessment/Plan:    Problem List Items Addressed This Visit          Digestive    Chronic hepatitis B (HCC)     Other Visit Diagnoses       Seropositive rheumatoid arthritis (HCC)    -  Primary    Relevant Orders    C-reactive protein    Sedimentation rate, automated    Long-term use of Plaquenil        Relevant Orders    CBC and differential    Comprehensive metabolic panel                 Reason for visit is follow up.      Chief Complaint   Patient presents with    Virtual Regular Visit          Encounter provider Safia Swanson MD      Recent Visits  No visits were found meeting these conditions.  Showing recent visits within past 7 days and meeting all other requirements  Today's Visits  Date Type Provider Dept   05/22/24 Telemedicine Safia Swanson MD  Rheumatology [facility]   Showing today's visits and meeting all other requirements  Future Appointments  No visits were found meeting these conditions.  Showing future appointments within next 150 days and meeting all other requirements       The patient was identified by name and date of birth. Frandy Fox was informed that this is a telemedicine visit and that the visit is being conducted through the Epic Embedded platform. She agrees to proceed..  My office door was closed. No one else was in the room.  She acknowledged consent and understanding of privacy and security of the video platform. The patient has agreed to participate and understands they can discontinue the visit at any time.    Patient is aware this is a billable service.       Subjective    HPI     INITIAL VISIT NOTE:  Ms. Fox is a 43-year-old female with no significant past medical history, who presents for further evaluation of diffuse joint pains and a positive rheumatoid factor at a titer of 80.  She is referred by   Alex.     Patient reports she has been experiencing diffuse joint pains over the past 2-3 years, but states in the past 1 year she has noticed worsening pain affecting her bilateral hands and feet.  She says the symptoms have been gradually progressive over time, and affect her on a daily basis in a mostly constant manner.  She does also intermittently experience pain at her wrists, shoulders, hips, knees and ankles, but these joint pains affect her more intermittently.  She has noticed swelling affecting her bilateral hands and wrists, and does present with swelling of her left wrist today.  She also experiences morning stiffness which affects her diffusely, including her hands, and states it lasts at least 1 hour but on occasion can persist throughout the day.  She has not tried taking any over-the-counter pain medications.  She has custody of her 3 grand children, and states repetitive hand movements required while caring for them (such as buttoning) really aggravates her symptoms  She was seen by her primary care physician in view of these complaints and had additional testing done which revealed a positive rheumatoid factor at a titer of 80. CBC, CMP, DINA screen, Lyme antibody profile, ESR and TSH were unremarkable.  She has not had any hand x-rays done.     She also reports a history of dry skin affecting her palms, but states this has currently resolved with the use of moisturizers.  She otherwise denies any other skin rash or history of psoriasis.  No family history of autoimmune disease.        5/23/2019:  Patient presents for follow-up of seropositive rheumatoid arthritis.  She is currently on hydroxychloroquine 200 mg twice daily that was started in March 2019.     She continues to report pain predominantly affecting her hands, feet, back and knees.  She states the pain is most prominent in the morning but resolves during the day.  She states the wrist swelling has resolved.  On occasion she will  still notice swelling affecting her fingers.  She has also been noticing over the past few weeks an intense pain associated with swelling affecting the dorsum of her right foot, which will cause a spasm of her toes.  This occurs and remits spontaneously.  There is otherwise no joint swelling noted.  She experiences morning stiffness affecting the same joints which lasts 1-2 hours.  She is currently not taking any over-the-counter pain medications.  She has been tolerating the hydroxychloroquine well.     She was seen by Gastroenterology following our prior office visit in view of the positive hepatitis B surface antigen.  She was also found to have a positive viral load.  Due to normal LFTs, and patient being asymptomatic, there was no indication to start her on antivirals unless there are plans for immunosuppressive therapy for the rheumatoid arthritis.  In view of this she was started on hydroxychloroquine as opposed to other DMARDs.  She will continue follow-up with Gastroenterology.        9/3/2019:  Patient presents for a follow-up of seropositive rheumatoid arthritis.  She is currently on hydroxychloroquine 200 mg twice daily.     She reports with the hydroxychloroquine there has been an improvement in the bilateral wrist pain, and only on occasion will she experience pain going through her fingers.  Her main complaint at this time is pain predominantly affecting her bilateral thumbs and index fingers at the IP and DIP joints, respectively.  She is denying any other significant joint pains, except for ongoing low back pain.  She notices occasional swelling affecting her fingers.  She does experience morning stiffness which mostly affects her hands and lasts about 30 minutes.  On occasion she will apply the diclofenac gel which does help her.     She has otherwise been tolerating the hydroxychloroquine well, and it seems like she is up-to-date with her eye exams.  No other complaints noted at this time.     Of  note she is continuing to follow-up with gastroenterology for the positive hepatitis B viral load, but there are no plans to start her on treatment unless we advance with rheumatoid arthritis treatment.        1/29/2020:  Patient presents for a follow-up of seropositive rheumatoid arthritis.  She is currently on hydroxychloroquine 200 mg twice daily.     She reports overall she is doing well.  She will still on occasion experience pain affecting her left hand PIP joints, as well as her shoulders and low back region.  She denies any significant pain of her right hand, wrists, elbows, hips, knees, ankles or feet.  She denies any swollen joints.  She does experience morning stiffness which can affect her hands but improves within about 20 minutes.  She is not taking any Tylenol or over-the-counter NSAIDs for her symptoms.  She has been tolerating the hydroxychloroquine well without any concerns for side effects.  She will be due for an annual eye exam later this year.     She continues to follow-up with Gastroenterology periodically for the chronic hepatitis B infection.        7/29/2020:  Patient presents for a follow-up of seropositive rheumatoid arthritis.  She is currently on hydroxychloroquine 200 mg twice daily.     She reports overall she is doing well and only experiences intermittent joint pains of her hands.  She denies any other joint pain or swelling.  She does experience mild morning stiffness of her bilateral hands which improves with activities.  She is not taking any over-the-counter pain medications.  If required she will use the Voltaren Gel topically.  She reports usually during the summer months her joint pains are better but she may experience a flare-up during the winter.        11/9/2022:  Patient presents for a follow-up of seropositive rheumatoid arthritis.  She is currently on hydroxychloroquine 400 mg once daily.     She reports she has been doing well and denies any joint pains, swelling or  stiffness.  Of note she is not up-to-date with her eye exams and is unsure if she even had a baseline eye exam done.        11/8/2023:  Patient presents for a follow-up of seropositive rheumatoid arthritis.  She is currently on hydroxychloroquine 400 mg once daily.     Patient reports throughout the year she had actually been doing fairly well but in the past few weeks possibly related to a change in weather she has been experiencing more pain affecting her hands but has also noticed pain in the upper groin region of her left hip associated with difficulty raising her leg.  No other concerning joint pains.  She has noticed swelling to affect her fingers.  She experiences morning stiffness affecting her hands that can take a few hours to improve.  She tries to avoid taking any over-the-counter pain medications given the history of chronic hepatitis B.     Of note she is not up-to-date with her eye exams and has not seen gastroenterology since 2020.      5/22/2024:  Patient presents for a follow-up of seropositive rheumatoid arthritis.  She is currently on hydroxychloroquine 400 mg once daily.    She reports overall she has been doing well and not had any concerning joint pains, swelling or stiffness.  She generally feels better with the warmer weather.  She now has insurance and will be updating labs and scheduling her appointment with ophthalmology.      Past Medical History:   Diagnosis Date    Disease of thyroid gland     Rheumatoid arthritis involving multiple sites (HCC) 11/30/2020       Past Surgical History:   Procedure Laterality Date    HYSTERECTOMY      TOTAL ABDOMINAL HYSTERECTOMY W/ BILATERAL SALPINGOOPHORECTOMY      TUBAL LIGATION         Current Outpatient Medications   Medication Sig Dispense Refill    hydroxychloroquine (PLAQUENIL) 200 mg tablet Take 2 tablets (400 mg total) by mouth daily with breakfast 180 tablet 1    meloxicam (Mobic) 15 mg tablet Take 1 tablet (15 mg total) by mouth daily as  needed for moderate pain 7 tablet 0    nicotine (NICODERM CQ) 21 mg/24 hr TD 24 hr patch Place 1 patch on the skin every 24 hours 28 patch 2     No current facility-administered medications for this visit.        Allergies   Allergen Reactions    Penicillins        Review of Systems  Constitutional: Negative for weight change, fevers, chills, night sweats, fatigue.  ENT/Mouth: Negative for hearing changes, ear pain, nasal congestion, sinus pain, hoarseness, sore throat, rhinorrhea, swallowing difficulty.   Eyes: Negative for pain, redness, discharge, vision changes.   Cardiovascular: Negative for chest pain, SOB, palpitations.   Respiratory: Negative for cough, sputum, wheezing, dyspnea.   Gastrointestinal: Negative for nausea, vomiting, diarrhea, constipation, pain, heartburn.  Genitourinary: Negative for dysuria, urinary frequency, hematuria.   Musculoskeletal: As per HPI.  Skin: Negative for skin rash, color changes.   Neuro: Negative for weakness, numbness, tingling, loss of consciousness.   Psych: Negative for anxiety, depression.   Heme/Lymph: Negative for easy bruising, bleeding, lymphadenopathy.        Video Exam    There were no vitals filed for this visit.    Physical Exam   General: Well appearing, well nourished, in no distress. Oriented x 3, normal mood and affect.  Skin: Good turgor, no rash, unusual bruising or prominent lesions.  Hair: Normal texture and distribution.  HEENT:  Head: Normocephalic, atraumatic.  Eyes: Conjunctiva clear, sclera non-icteric, EOM intact.  Nose: No external lesions.  Mouth: Mucous membranes moist.  Neck: Supple.  Neurologic: Alert and oriented. No focal neurological deficits appreciated.   Psychiatric: Normal mood and affect.       Assessment and Plan:   Ms. Fox is a 48-year-old female with history significant for seropositive rheumatoid arthritis and chronic hepatitis B infection who presents for a follow-up.  She is currently on hydroxychloroquine 400 mg once daily.      # Seropositive rheumatoid arthritis  - Frandy presents today for a follow-up of seropositive rheumatoid arthritis and is currently on hydroxychloroquine 400 mg once daily.  While she was undergoing screening tests for initiation of DMARD therapy she was found to have a positive hepatitis B surface antigen and elevated HBV DNA viral load.  In view of this we opted to start her on hydroxychloroquine.       - She reports she has overall been feeling great and does not offer any complaints today.  The warmer weather it does help her.  She will continue the hydroxychloroquine unchanged and now that she has insurance will be updating the monitoring labs for toxicity and also scheduling her annual follow-up appointments with ophthalmology.  She has a supply of the meloxicam to take if needed but has not used this at all yet.     # Chronic hepatitis B infection   - If a step up in DMARDS is required prior to doing so she will need to follow up with Gastroenterology to be started on antiviral therapy for the chronic hepatitis-B.       Visit Time  Total Visit Duration: 12 minutes.

## 2024-06-01 LAB
APOB+LDLR+PCSK9 GENE MUT ANL BLD/T: NOT DETECTED
BRCA1+BRCA2 DEL+DUP + FULL MUT ANL BLD/T: NOT DETECTED
MLH1+MSH2+MSH6+PMS2 GN DEL+DUP+FUL M: NOT DETECTED

## 2024-11-24 DIAGNOSIS — M05.9 SEROPOSITIVE RHEUMATOID ARTHRITIS (HCC): ICD-10-CM

## 2024-11-25 RX ORDER — HYDROXYCHLOROQUINE SULFATE 200 MG/1
400 TABLET, FILM COATED ORAL
Qty: 180 TABLET | Refills: 1 | Status: SHIPPED | OUTPATIENT
Start: 2024-11-25 | End: 2025-05-24

## 2024-12-09 PROBLEM — M05.9 SEROPOSITIVE RHEUMATOID ARTHRITIS (HCC): Status: ACTIVE | Noted: 2024-12-09

## 2024-12-09 PROBLEM — Z79.899 LONG-TERM USE OF PLAQUENIL: Status: ACTIVE | Noted: 2024-12-09

## 2024-12-11 ENCOUNTER — OFFICE VISIT (OUTPATIENT)
Dept: RHEUMATOLOGY | Facility: CLINIC | Age: 49
End: 2024-12-11

## 2024-12-11 VITALS
BODY MASS INDEX: 30.32 KG/M2 | HEIGHT: 67 IN | HEART RATE: 85 BPM | TEMPERATURE: 97.7 F | OXYGEN SATURATION: 97 % | SYSTOLIC BLOOD PRESSURE: 124 MMHG | DIASTOLIC BLOOD PRESSURE: 90 MMHG | WEIGHT: 193.2 LBS

## 2024-12-11 DIAGNOSIS — B18.1 CHRONIC HEPATITIS B (HCC): ICD-10-CM

## 2024-12-11 DIAGNOSIS — Z79.899 LONG-TERM USE OF PLAQUENIL: ICD-10-CM

## 2024-12-11 DIAGNOSIS — M05.9 SEROPOSITIVE RHEUMATOID ARTHRITIS (HCC): Primary | ICD-10-CM

## 2024-12-11 NOTE — PROGRESS NOTES
Name: Frandy Fox      : 1975      MRN: [id number]  Encounter Provider: Safia Swanson MD  Encounter Date: 2024   Encounter department: [facility] RHEUMATOLOGY [facility]  :  Assessment & Plan  Seropositive rheumatoid arthritis (HCC)  Ms. Fox is a 49-year-old female with history significant for seropositive rheumatoid arthritis and chronic hepatitis B infection who presents for a follow-up.  She is currently on hydroxychloroquine 400 mg once daily.     # Seropositive rheumatoid arthritis  - Frandy presents today for a follow-up of seropositive rheumatoid arthritis and is currently on hydroxychloroquine 400 mg once daily.  While she was undergoing screening tests for initiation of DMARD therapy she was found to have a positive hepatitis B surface antigen and elevated HBV DNA viral load.  In view of this we opted to start her on hydroxychloroquine.       - She reports she has overall been stable but does experience various joint pains mostly in her wrists and feet.  I will evaluate by obtaining x-rays of these regions and determine based on this if a step up in DMARD therapy is indicated.  For now she would prefer to continue monotherapy with the hydroxychloroquine at 400 mg once daily.  She will update high risk medication lab monitoring to assess for drug toxicities and I strongly encouraged her to schedule her annual eye exam.     Orders:    C-reactive protein; Future    Sedimentation rate, automated; Future    XR foot 3+ vw left; Future    XR foot 3+ vw right; Future    XR hand 3+ vw right; Future    XR hand 3+ vw left; Future    Long-term use of Plaquenil    Orders:    CBC and differential; Future    Comprehensive metabolic panel; Future    Chronic hepatitis B (HCC)   - If a step up in DMARDS is required prior to doing so she will need to follow up with Gastroenterology to be started on antiviral therapy for the chronic hepatitis-B.            Patient's rheumatologic disease(s) threaten  long-term function if not appropriately managed.      History of Present Illness   HPI  INITIAL VISIT NOTE:  Ms. Fox is a 43-year-old female with no significant past medical history, who presents for further evaluation of diffuse joint pains and a positive rheumatoid factor at a titer of 80.  She is referred by Dr. Johnson.     Patient reports she has been experiencing diffuse joint pains over the past 2-3 years, but states in the past 1 year she has noticed worsening pain affecting her bilateral hands and feet.  She says the symptoms have been gradually progressive over time, and affect her on a daily basis in a mostly constant manner.  She does also intermittently experience pain at her wrists, shoulders, hips, knees and ankles, but these joint pains affect her more intermittently.  She has noticed swelling affecting her bilateral hands and wrists, and does present with swelling of her left wrist today.  She also experiences morning stiffness which affects her diffusely, including her hands, and states it lasts at least 1 hour but on occasion can persist throughout the day.  She has not tried taking any over-the-counter pain medications.  She has custody of her 3 grand children, and states repetitive hand movements required while caring for them (such as buttoning) really aggravates her symptoms  She was seen by her primary care physician in view of these complaints and had additional testing done which revealed a positive rheumatoid factor at a titer of 80. CBC, CMP, DINA screen, Lyme antibody profile, ESR and TSH were unremarkable.  She has not had any hand x-rays done.     She also reports a history of dry skin affecting her palms, but states this has currently resolved with the use of moisturizers.  She otherwise denies any other skin rash or history of psoriasis.  No family history of autoimmune disease.        5/23/2019:  Patient presents for follow-up of seropositive rheumatoid arthritis.  She is  currently on hydroxychloroquine 200 mg twice daily that was started in March 2019.     She continues to report pain predominantly affecting her hands, feet, back and knees.  She states the pain is most prominent in the morning but resolves during the day.  She states the wrist swelling has resolved.  On occasion she will still notice swelling affecting her fingers.  She has also been noticing over the past few weeks an intense pain associated with swelling affecting the dorsum of her right foot, which will cause a spasm of her toes.  This occurs and remits spontaneously.  There is otherwise no joint swelling noted.  She experiences morning stiffness affecting the same joints which lasts 1-2 hours.  She is currently not taking any over-the-counter pain medications.  She has been tolerating the hydroxychloroquine well.     She was seen by Gastroenterology following our prior office visit in view of the positive hepatitis B surface antigen.  She was also found to have a positive viral load.  Due to normal LFTs, and patient being asymptomatic, there was no indication to start her on antivirals unless there are plans for immunosuppressive therapy for the rheumatoid arthritis.  In view of this she was started on hydroxychloroquine as opposed to other DMARDs.  She will continue follow-up with Gastroenterology.        9/3/2019:  Patient presents for a follow-up of seropositive rheumatoid arthritis.  She is currently on hydroxychloroquine 200 mg twice daily.     She reports with the hydroxychloroquine there has been an improvement in the bilateral wrist pain, and only on occasion will she experience pain going through her fingers.  Her main complaint at this time is pain predominantly affecting her bilateral thumbs and index fingers at the IP and DIP joints, respectively.  She is denying any other significant joint pains, except for ongoing low back pain.  She notices occasional swelling affecting her fingers.  She does  experience morning stiffness which mostly affects her hands and lasts about 30 minutes.  On occasion she will apply the diclofenac gel which does help her.     She has otherwise been tolerating the hydroxychloroquine well, and it seems like she is up-to-date with her eye exams.  No other complaints noted at this time.     Of note she is continuing to follow-up with gastroenterology for the positive hepatitis B viral load, but there are no plans to start her on treatment unless we advance with rheumatoid arthritis treatment.        1/29/2020:  Patient presents for a follow-up of seropositive rheumatoid arthritis.  She is currently on hydroxychloroquine 200 mg twice daily.     She reports overall she is doing well.  She will still on occasion experience pain affecting her left hand PIP joints, as well as her shoulders and low back region.  She denies any significant pain of her right hand, wrists, elbows, hips, knees, ankles or feet.  She denies any swollen joints.  She does experience morning stiffness which can affect her hands but improves within about 20 minutes.  She is not taking any Tylenol or over-the-counter NSAIDs for her symptoms.  She has been tolerating the hydroxychloroquine well without any concerns for side effects.  She will be due for an annual eye exam later this year.     She continues to follow-up with Gastroenterology periodically for the chronic hepatitis B infection.        7/29/2020:  Patient presents for a follow-up of seropositive rheumatoid arthritis.  She is currently on hydroxychloroquine 200 mg twice daily.     She reports overall she is doing well and only experiences intermittent joint pains of her hands.  She denies any other joint pain or swelling.  She does experience mild morning stiffness of her bilateral hands which improves with activities.  She is not taking any over-the-counter pain medications.  If required she will use the Voltaren Gel topically.  She reports usually during  the summer months her joint pains are better but she may experience a flare-up during the winter.        11/9/2022:  Patient presents for a follow-up of seropositive rheumatoid arthritis.  She is currently on hydroxychloroquine 400 mg once daily.     She reports she has been doing well and denies any joint pains, swelling or stiffness.  Of note she is not up-to-date with her eye exams and is unsure if she even had a baseline eye exam done.        11/8/2023:  Patient presents for a follow-up of seropositive rheumatoid arthritis.  She is currently on hydroxychloroquine 400 mg once daily.     Patient reports throughout the year she had actually been doing fairly well but in the past few weeks possibly related to a change in weather she has been experiencing more pain affecting her hands but has also noticed pain in the upper groin region of her left hip associated with difficulty raising her leg.  No other concerning joint pains.  She has noticed swelling to affect her fingers.  She experiences morning stiffness affecting her hands that can take a few hours to improve.  She tries to avoid taking any over-the-counter pain medications given the history of chronic hepatitis B.     Of note she is not up-to-date with her eye exams and has not seen gastroenterology since 2020.        5/22/2024:  Patient presents for a follow-up of seropositive rheumatoid arthritis.  She is currently on hydroxychloroquine 400 mg once daily.     She reports overall she has been doing well and not had any concerning joint pains, swelling or stiffness.  She generally feels better with the warmer weather.  She now has insurance and will be updating labs and scheduling her appointment with ophthalmology.       12/11/2024:  Patient presents for a follow-up of seropositive rheumatoid arthritis.  She is currently on hydroxychloroquine 400 mg once daily.    She mentions she has overall been stable but does feel like she experiences joint pains that  she may have gotten used to.  This mostly affects her feet and in her wrists.  No swelling.  She does experience widespread morning stiffness that takes about 20 minutes to improve.  She tries to avoid any pain medications and did not try any over-the-counter options or the meloxicam that I previously prescribed.    She has not yet scheduled her eye exam but will plan to do this in the new year.          Review of Systems  Constitutional: Negative for weight change, fevers, chills, night sweats, fatigue.  ENT/Mouth: Negative for hearing changes, ear pain, nasal congestion, sinus pain, hoarseness, sore throat, rhinorrhea, swallowing difficulty.   Eyes: Negative for pain, redness, discharge, vision changes.   Cardiovascular: Negative for chest pain, SOB, palpitations.   Respiratory: Negative for cough, sputum, wheezing, dyspnea.   Gastrointestinal: Negative for nausea, vomiting, diarrhea, constipation, pain, heartburn.  Genitourinary: Negative for dysuria, urinary frequency, hematuria.   Musculoskeletal: As per HPI.  Skin: Negative for skin rash, color changes.   Neuro: Negative for weakness, numbness, tingling, loss of consciousness.   Psych: Negative for anxiety, depression.   Heme/Lymph: Negative for easy bruising, bleeding, lymphadenopathy.      Past Medical History   Past Medical History:   Diagnosis Date    Disease of thyroid gland     Rheumatoid arthritis involving multiple sites (HCC) 11/30/2020     Past Surgical History:   Procedure Laterality Date    HYSTERECTOMY      TOTAL ABDOMINAL HYSTERECTOMY W/ BILATERAL SALPINGOOPHORECTOMY      TUBAL LIGATION       Family History   Problem Relation Age of Onset    Ovarian cancer Mother     No Known Problems Brother     Pancreatic cancer Maternal Grandfather     Thyroid cancer Maternal Aunt     Liver cancer Maternal Uncle     Pancreatic cancer Maternal Uncle     No Known Problems Daughter     No Known Problems Maternal Grandmother     No Known Problems Paternal  "Grandmother     No Known Problems Paternal Grandfather     No Known Problems Son     No Known Problems Daughter     No Known Problems Maternal Uncle       reports that she has been smoking cigarettes. She has a 20 pack-year smoking history. She has never used smokeless tobacco. She reports that she does not currently use alcohol. She reports that she does not use drugs.  Current Outpatient Medications on File Prior to Visit   Medication Sig Dispense Refill    hydroxychloroquine (PLAQUENIL) 200 mg tablet Take 2 tablets (400 mg total) by mouth daily with breakfast 180 tablet 1    [DISCONTINUED] meloxicam (Mobic) 15 mg tablet Take 1 tablet (15 mg total) by mouth daily as needed for moderate pain 7 tablet 0    nicotine (NICODERM CQ) 21 mg/24 hr TD 24 hr patch Place 1 patch on the skin every 24 hours 28 patch 2     No current facility-administered medications on file prior to visit.     Allergies   Allergen Reactions    Penicillins       Current Outpatient Medications on File Prior to Visit   Medication Sig Dispense Refill    hydroxychloroquine (PLAQUENIL) 200 mg tablet Take 2 tablets (400 mg total) by mouth daily with breakfast 180 tablet 1    [DISCONTINUED] meloxicam (Mobic) 15 mg tablet Take 1 tablet (15 mg total) by mouth daily as needed for moderate pain 7 tablet 0    nicotine (NICODERM CQ) 21 mg/24 hr TD 24 hr patch Place 1 patch on the skin every 24 hours 28 patch 2     No current facility-administered medications on file prior to visit.      Social History     Tobacco Use    Smoking status: Every Day     Current packs/day: 1.00     Average packs/day: 1 pack/day for 20.0 years (20.0 ttl pk-yrs)     Types: Cigarettes    Smokeless tobacco: Never   Vaping Use    Vaping status: Never Used   Substance and Sexual Activity    Alcohol use: Not Currently     Comment: rare    Drug use: No    Sexual activity: Yes        Objective   /90   Pulse 85   Temp 97.7 °F (36.5 °C) (Tympanic)   Ht 5' 7\" (1.702 m)   Wt 87.6 kg " (193 lb 3.2 oz)   SpO2 97%   BMI 30.26 kg/m²      Physical Exam  General: Well appearing, well nourished, in no distress. Oriented x 3, normal mood and affect.  Ambulating without difficulty.  Skin: Good turgor, no rash, unusual bruising or prominent lesions.  Hair: Normal texture and distribution.  Nails: Normal color, no deformities.  HEENT:  Head: Normocephalic, atraumatic.  Eyes: Conjunctiva clear, sclera non-icteric, EOM intact.  Nose: No external lesions.  Neck: Supple.  Extremities: No amputations or deformities, cyanosis, edema.  Musculoskeletal:   There is no peripheral joint soft tissue swelling noted.  Neurologic: Alert and oriented. No focal neurological deficits appreciated.   Psychiatric: Normal mood and affect.

## 2024-12-11 NOTE — ASSESSMENT & PLAN NOTE
Ms. Fox is a 49-year-old female with history significant for seropositive rheumatoid arthritis and chronic hepatitis B infection who presents for a follow-up.  She is currently on hydroxychloroquine 400 mg once daily.     # Seropositive rheumatoid arthritis  - Frandy presents today for a follow-up of seropositive rheumatoid arthritis and is currently on hydroxychloroquine 400 mg once daily.  While she was undergoing screening tests for initiation of DMARD therapy she was found to have a positive hepatitis B surface antigen and elevated HBV DNA viral load.  In view of this we opted to start her on hydroxychloroquine.       - She reports she has overall been stable but does experience various joint pains mostly in her wrists and feet.  I will evaluate by obtaining x-rays of these regions and determine based on this if a step up in DMARD therapy is indicated.  For now she would prefer to continue monotherapy with the hydroxychloroquine at 400 mg once daily.  She will update high risk medication lab monitoring to assess for drug toxicities and I strongly encouraged her to schedule her annual eye exam.     Orders:    C-reactive protein; Future    Sedimentation rate, automated; Future    XR foot 3+ vw left; Future    XR foot 3+ vw right; Future    XR hand 3+ vw right; Future    XR hand 3+ vw left; Future

## 2024-12-11 NOTE — ASSESSMENT & PLAN NOTE
- If a step up in DMARDS is required prior to doing so she will need to follow up with Gastroenterology to be started on antiviral therapy for the chronic hepatitis-B.

## 2025-03-13 ENCOUNTER — APPOINTMENT (OUTPATIENT)
Dept: LAB | Facility: CLINIC | Age: 50
End: 2025-03-13
Payer: COMMERCIAL

## 2025-03-13 ENCOUNTER — APPOINTMENT (OUTPATIENT)
Dept: RADIOLOGY | Facility: CLINIC | Age: 50
End: 2025-03-13
Payer: COMMERCIAL

## 2025-03-13 DIAGNOSIS — M05.9 SEROPOSITIVE RHEUMATOID ARTHRITIS (HCC): ICD-10-CM

## 2025-03-13 DIAGNOSIS — Z79.899 LONG-TERM USE OF PLAQUENIL: ICD-10-CM

## 2025-03-13 LAB
ALBUMIN SERPL BCG-MCNC: 4.8 G/DL (ref 3.5–5)
ALP SERPL-CCNC: 73 U/L (ref 34–104)
ALT SERPL W P-5'-P-CCNC: 22 U/L (ref 7–52)
ANION GAP SERPL CALCULATED.3IONS-SCNC: 10 MMOL/L (ref 4–13)
AST SERPL W P-5'-P-CCNC: 19 U/L (ref 13–39)
BASOPHILS # BLD AUTO: 0.04 THOUSANDS/ÂΜL (ref 0–0.1)
BASOPHILS NFR BLD AUTO: 1 % (ref 0–1)
BILIRUB SERPL-MCNC: 0.46 MG/DL (ref 0.2–1)
BUN SERPL-MCNC: 15 MG/DL (ref 5–25)
CALCIUM SERPL-MCNC: 10 MG/DL (ref 8.4–10.2)
CHLORIDE SERPL-SCNC: 105 MMOL/L (ref 96–108)
CO2 SERPL-SCNC: 24 MMOL/L (ref 21–32)
CREAT SERPL-MCNC: 0.66 MG/DL (ref 0.6–1.3)
CRP SERPL QL: 3.2 MG/L
EOSINOPHIL # BLD AUTO: 0.11 THOUSAND/ÂΜL (ref 0–0.61)
EOSINOPHIL NFR BLD AUTO: 2 % (ref 0–6)
ERYTHROCYTE [DISTWIDTH] IN BLOOD BY AUTOMATED COUNT: 12.5 % (ref 11.6–15.1)
ERYTHROCYTE [SEDIMENTATION RATE] IN BLOOD: 10 MM/HOUR (ref 0–19)
GFR SERPL CREATININE-BSD FRML MDRD: 104 ML/MIN/1.73SQ M
GLUCOSE P FAST SERPL-MCNC: 96 MG/DL (ref 65–99)
HCT VFR BLD AUTO: 45.7 % (ref 34.8–46.1)
HGB BLD-MCNC: 15.6 G/DL (ref 11.5–15.4)
IMM GRANULOCYTES # BLD AUTO: 0.01 THOUSAND/UL (ref 0–0.2)
IMM GRANULOCYTES NFR BLD AUTO: 0 % (ref 0–2)
LYMPHOCYTES # BLD AUTO: 2.07 THOUSANDS/ÂΜL (ref 0.6–4.47)
LYMPHOCYTES NFR BLD AUTO: 32 % (ref 14–44)
MCH RBC QN AUTO: 31 PG (ref 26.8–34.3)
MCHC RBC AUTO-ENTMCNC: 34.1 G/DL (ref 31.4–37.4)
MCV RBC AUTO: 91 FL (ref 82–98)
MONOCYTES # BLD AUTO: 0.38 THOUSAND/ÂΜL (ref 0.17–1.22)
MONOCYTES NFR BLD AUTO: 6 % (ref 4–12)
NEUTROPHILS # BLD AUTO: 3.88 THOUSANDS/ÂΜL (ref 1.85–7.62)
NEUTS SEG NFR BLD AUTO: 59 % (ref 43–75)
NRBC BLD AUTO-RTO: 0 /100 WBCS
PLATELET # BLD AUTO: 274 THOUSANDS/UL (ref 149–390)
PMV BLD AUTO: 10.3 FL (ref 8.9–12.7)
POTASSIUM SERPL-SCNC: 5 MMOL/L (ref 3.5–5.3)
PROT SERPL-MCNC: 7.7 G/DL (ref 6.4–8.4)
RBC # BLD AUTO: 5.04 MILLION/UL (ref 3.81–5.12)
SODIUM SERPL-SCNC: 139 MMOL/L (ref 135–147)
WBC # BLD AUTO: 6.49 THOUSAND/UL (ref 4.31–10.16)

## 2025-03-13 PROCEDURE — 86140 C-REACTIVE PROTEIN: CPT

## 2025-03-13 PROCEDURE — 85025 COMPLETE CBC W/AUTO DIFF WBC: CPT

## 2025-03-13 PROCEDURE — 73130 X-RAY EXAM OF HAND: CPT

## 2025-03-13 PROCEDURE — 80053 COMPREHEN METABOLIC PANEL: CPT

## 2025-03-13 PROCEDURE — 85652 RBC SED RATE AUTOMATED: CPT

## 2025-03-13 PROCEDURE — 36415 COLL VENOUS BLD VENIPUNCTURE: CPT

## 2025-03-14 ENCOUNTER — OFFICE VISIT (OUTPATIENT)
Dept: FAMILY MEDICINE CLINIC | Facility: CLINIC | Age: 50
End: 2025-03-14
Payer: COMMERCIAL

## 2025-03-14 VITALS
BODY MASS INDEX: 30.2 KG/M2 | TEMPERATURE: 97.8 F | SYSTOLIC BLOOD PRESSURE: 112 MMHG | HEART RATE: 81 BPM | DIASTOLIC BLOOD PRESSURE: 80 MMHG | WEIGHT: 192.4 LBS | HEIGHT: 67 IN | OXYGEN SATURATION: 98 %

## 2025-03-14 DIAGNOSIS — Z12.31 ENCOUNTER FOR SCREENING MAMMOGRAM FOR BREAST CANCER: ICD-10-CM

## 2025-03-14 DIAGNOSIS — Z12.11 SCREENING FOR COLORECTAL CANCER: Primary | ICD-10-CM

## 2025-03-14 DIAGNOSIS — Z11.59 NEED FOR HEPATITIS C SCREENING TEST: ICD-10-CM

## 2025-03-14 DIAGNOSIS — M05.79 RHEUMATOID ARTHRITIS INVOLVING MULTIPLE SITES WITH POSITIVE RHEUMATOID FACTOR (HCC): ICD-10-CM

## 2025-03-14 DIAGNOSIS — R76.8 HEPATITIS B SURFACE ANTIGEN POSITIVE: ICD-10-CM

## 2025-03-14 DIAGNOSIS — Z72.0 TOBACCO USE: ICD-10-CM

## 2025-03-14 DIAGNOSIS — Z79.899 LONG-TERM USE OF PLAQUENIL: ICD-10-CM

## 2025-03-14 DIAGNOSIS — Z13.220 SCREENING CHOLESTEROL LEVEL: ICD-10-CM

## 2025-03-14 DIAGNOSIS — Z86.39 HISTORY OF THYROID NODULE: ICD-10-CM

## 2025-03-14 DIAGNOSIS — Z00.00 ANNUAL PHYSICAL EXAM: ICD-10-CM

## 2025-03-14 DIAGNOSIS — Z86.19 HX OF TYPE B VIRAL HEPATITIS: ICD-10-CM

## 2025-03-14 DIAGNOSIS — B18.1 CHRONIC HEPATITIS B (HCC): ICD-10-CM

## 2025-03-14 DIAGNOSIS — Z12.12 SCREENING FOR COLORECTAL CANCER: Primary | ICD-10-CM

## 2025-03-14 PROCEDURE — 99213 OFFICE O/P EST LOW 20 MIN: CPT | Performed by: INTERNAL MEDICINE

## 2025-03-14 PROCEDURE — 99396 PREV VISIT EST AGE 40-64: CPT | Performed by: INTERNAL MEDICINE

## 2025-03-14 NOTE — PROGRESS NOTES
Adult Annual Physical  Name: Frandy Fox      : 1975      MRN: [id number]  Encounter Provider: Roxane Barragan MD  Encounter Date: 3/14/2025   Encounter department: [facility]    Assessment & Plan  Encounter for screening mammogram for breast cancer    Orders:    Mammo screening bilateral w 3d and cad; Future    Screening for colorectal cancer    Orders:    Cologuard    Comprehensive metabolic panel; Future    Chronic hepatitis B (HCC)  Patient has been treated in the past for chronic hepatitis B and seen by GI but it has been several years since she has had an appointment so she probably should have another 1  Orders:    Hepatic function panel; Future    Hepatitis B DNA, Quantitative PCR; Future    Hepatitis C RNA, Quantitative PCR; Future    Rheumatoid arthritis involving multiple sites with positive rheumatoid factor (HCC)  Follows with rheumatology and is on immunosuppressive's       Tobacco use  Unfortunately continues to smoke and again was counseled 3 to 10 minutes on the benefits of and ways to quit smoking       Hepatitis B surface antigen positive         History of thyroid nodule  His last thyroid studies were normal but we will recheck       Long-term use of Plaquenil         Need for hepatitis C screening test  She also has a history of hepatitis C but is felt to be good to good in the past to have treatment at present but I put the consult in for GI anyway       Screening cholesterol level    Orders:    Lipid panel; Future    Hx of type B viral hepatitis    Orders:    Ambulatory Referral to Colorectal Surgery; Future    Preventive Screenings:  - Diabetes Screening: screening up-to-date  - Hepatitis C screening: screening up-to-date   - HIV screening: screening up-to-date   - Cervical cancer screening: screening not indicated   - Breast cancer screening: risks/benefits discussed   - Colon cancer screening: patient declines   - Lung cancer screening: screening not indicated      Immunizations:  - Immunizations due: Influenza, Prevnar 20, Tdap and Hepatitis A    Counseling/Anticipatory Guidance:    - Tobacco use: discussed harms of tobacco use and management options for quitting.       Depression Screening and Follow-up Plan: Patient was screened for depression during today's encounter. They screened negative with a PHQ-2 score of 0.      Tobacco Cessation Counseling: Tobacco cessation counseling was provided. The patient is sincerely urged to quit consumption of tobacco. She is not ready to quit tobacco. Medication options discussed.         History of Present Illness     Adult Annual Physical:  Patient presents for annual physical. She really is just here because she does not have a family doctor she has no major new complaints.     Diet and Physical Activity:  - Diet/Nutrition: well balanced diet.  - Exercise: 3-4 times a week on average.    Depression Screening:  - PHQ-2 Score: 0    General Health:  - Sleep: sleeps well and 7-8 hours of sleep on average.  - Hearing: normal hearing bilateral ears.  - Vision: no vision problems and wears glasses.  - Dental: no dental visits for > 1 year, brushes teeth once daily and does not floss.    /GYN Health:  - Follows with GYN: no.   - Menopause: postmenopausal.   - History of STDs: no    Advanced Care Planning:  - Has an advanced directive?: no    - Has a durable medical POA?: no    - ACP document given to patient?: yes      Review of Systems   Constitutional:  Negative for chills and fever.   HENT: Negative.  Negative for dental problem, ear pain, postnasal drip, sinus pressure and sore throat.    Eyes:  Positive for visual disturbance (glasses). Negative for pain and discharge.   Respiratory:  Negative for cough, chest tightness, shortness of breath and wheezing.    Cardiovascular:  Positive for palpitations (rare). Negative for chest pain and leg swelling.   Gastrointestinal:  Negative for abdominal pain, blood in stool, constipation,  "diarrhea and vomiting.   Endocrine: Negative.    Genitourinary:  Positive for frequency. Negative for dysuria and hematuria.   Musculoskeletal: Negative.  Negative for arthralgias and back pain.   Skin:  Negative for color change and rash.   Allergic/Immunologic: Positive for immunocompromised state.   Neurological:  Positive for dizziness, weakness and light-headedness. Negative for seizures and syncope.   Hematological: Negative.    Psychiatric/Behavioral:  Negative for agitation and dysphoric mood. The patient is not nervous/anxious.    All other systems reviewed and are negative.        Objective   /80 (BP Location: Right arm, Patient Position: Sitting, Cuff Size: Large)   Pulse 81   Temp 97.8 °F (36.6 °C) (Temporal)   Ht 5' 7\" (1.702 m)   Wt 87.3 kg (192 lb 6.4 oz)   SpO2 98%   Breastfeeding No   BMI 30.13 kg/m²     Physical Exam  Constitutional:       Appearance: Normal appearance. She is normal weight.   HENT:      Head: Normocephalic.      Right Ear: Tympanic membrane normal.      Left Ear: Tympanic membrane normal.      Nose: Nose normal.      Mouth/Throat:      Mouth: Mucous membranes are moist.   Eyes:      Extraocular Movements: Extraocular movements intact.      Pupils: Pupils are equal, round, and reactive to light.   Neck:      Vascular: No carotid bruit.      Comments: No thyroidmeagy  Cardiovascular:      Rate and Rhythm: Normal rate and regular rhythm.      Pulses: Normal pulses.      Heart sounds: Normal heart sounds.   Pulmonary:      Effort: Pulmonary effort is normal.      Breath sounds: Normal breath sounds.   Abdominal:      General: Abdomen is flat. Bowel sounds are normal.      Palpations: Abdomen is soft. There is no mass.      Tenderness: There is no abdominal tenderness.   Musculoskeletal:         General: No swelling.      Cervical back: Normal range of motion and neck supple.      Right lower leg: No edema.      Left lower leg: No edema.   Lymphadenopathy:      Cervical: " No cervical adenopathy.   Skin:     General: Skin is warm and dry.      Capillary Refill: Capillary refill takes less than 2 seconds.      Findings: No lesion or rash.   Neurological:      General: No focal deficit present.      Mental Status: She is alert and oriented to person, place, and time. Mental status is at baseline.      Cranial Nerves: No cranial nerve deficit.      Sensory: No sensory deficit.      Gait: Gait normal.      Deep Tendon Reflexes: Reflexes normal.   Psychiatric:         Mood and Affect: Mood normal.         Behavior: Behavior normal.         Thought Content: Thought content normal.         Judgment: Judgment normal.

## 2025-03-14 NOTE — ASSESSMENT & PLAN NOTE
Patient has been treated in the past for chronic hepatitis B and seen by GI but it has been several years since she has had an appointment so she probably should have another 1  Orders:    Hepatic function panel; Future    Hepatitis B DNA, Quantitative PCR; Future    Hepatitis C RNA, Quantitative PCR; Future

## 2025-03-14 NOTE — ASSESSMENT & PLAN NOTE
Unfortunately continues to smoke and again was counseled 3 to 10 minutes on the benefits of and ways to quit smoking

## 2025-03-27 ENCOUNTER — APPOINTMENT (OUTPATIENT)
Dept: LAB | Facility: MEDICAL CENTER | Age: 50
End: 2025-03-27
Payer: COMMERCIAL

## 2025-03-27 DIAGNOSIS — Z12.11 SCREENING FOR COLORECTAL CANCER: ICD-10-CM

## 2025-03-27 DIAGNOSIS — Z12.12 SCREENING FOR COLORECTAL CANCER: ICD-10-CM

## 2025-03-27 DIAGNOSIS — B18.1 CHRONIC HEPATITIS B (HCC): ICD-10-CM

## 2025-03-27 DIAGNOSIS — Z13.220 SCREENING CHOLESTEROL LEVEL: ICD-10-CM

## 2025-03-27 LAB
ALBUMIN SERPL BCG-MCNC: 4.4 G/DL (ref 3.5–5)
ALP SERPL-CCNC: 67 U/L (ref 34–104)
ALT SERPL W P-5'-P-CCNC: 28 U/L (ref 7–52)
ANION GAP SERPL CALCULATED.3IONS-SCNC: 11 MMOL/L (ref 4–13)
AST SERPL W P-5'-P-CCNC: 18 U/L (ref 13–39)
BILIRUB DIRECT SERPL-MCNC: 0.05 MG/DL (ref 0–0.2)
BILIRUB SERPL-MCNC: 0.31 MG/DL (ref 0.2–1)
BUN SERPL-MCNC: 19 MG/DL (ref 5–25)
CALCIUM SERPL-MCNC: 9.5 MG/DL (ref 8.4–10.2)
CHLORIDE SERPL-SCNC: 109 MMOL/L (ref 96–108)
CHOLEST SERPL-MCNC: 205 MG/DL (ref ?–200)
CO2 SERPL-SCNC: 22 MMOL/L (ref 21–32)
CREAT SERPL-MCNC: 0.67 MG/DL (ref 0.6–1.3)
GFR SERPL CREATININE-BSD FRML MDRD: 103 ML/MIN/1.73SQ M
GLUCOSE P FAST SERPL-MCNC: 83 MG/DL (ref 65–99)
HDLC SERPL-MCNC: 52 MG/DL
LDLC SERPL CALC-MCNC: 119 MG/DL (ref 0–100)
NONHDLC SERPL-MCNC: 153 MG/DL
POTASSIUM SERPL-SCNC: 4.4 MMOL/L (ref 3.5–5.3)
PROT SERPL-MCNC: 7.1 G/DL (ref 6.4–8.4)
SODIUM SERPL-SCNC: 142 MMOL/L (ref 135–147)
TRIGL SERPL-MCNC: 168 MG/DL (ref ?–150)

## 2025-03-27 PROCEDURE — 87522 HEPATITIS C REVRS TRNSCRPJ: CPT

## 2025-03-27 PROCEDURE — 80053 COMPREHEN METABOLIC PANEL: CPT

## 2025-03-27 PROCEDURE — 87517 HEPATITIS B DNA QUANT: CPT

## 2025-03-27 PROCEDURE — 80061 LIPID PANEL: CPT

## 2025-03-27 PROCEDURE — 87521 HEPATITIS C PROBE&RVRS TRNSC: CPT

## 2025-03-27 PROCEDURE — 82248 BILIRUBIN DIRECT: CPT

## 2025-03-27 PROCEDURE — 36415 COLL VENOUS BLD VENIPUNCTURE: CPT

## 2025-03-28 ENCOUNTER — RESULTS FOLLOW-UP (OUTPATIENT)
Dept: FAMILY MEDICINE CLINIC | Facility: CLINIC | Age: 50
End: 2025-03-28

## 2025-03-28 LAB
HBV DNA SERPL NAA+PROBE-ACNC: ABNORMAL IU/ML
HBV DNA SERPL NAA+PROBE-ACNC: DETECTED [IU]/ML
HCV RNA SERPL NAA+PROBE-ACNC: NOT DETECTED K[IU]/ML

## 2025-04-11 ENCOUNTER — OFFICE VISIT (OUTPATIENT)
Dept: URGENT CARE | Facility: MEDICAL CENTER | Age: 50
End: 2025-04-11
Payer: COMMERCIAL

## 2025-04-11 VITALS
OXYGEN SATURATION: 97 % | TEMPERATURE: 97.7 F | SYSTOLIC BLOOD PRESSURE: 123 MMHG | BODY MASS INDEX: 30.29 KG/M2 | WEIGHT: 193 LBS | RESPIRATION RATE: 18 BRPM | HEART RATE: 94 BPM | DIASTOLIC BLOOD PRESSURE: 64 MMHG | HEIGHT: 67 IN

## 2025-04-11 DIAGNOSIS — H66.92 LEFT OTITIS MEDIA, UNSPECIFIED OTITIS MEDIA TYPE: Primary | ICD-10-CM

## 2025-04-11 DIAGNOSIS — J06.9 ACUTE URI: ICD-10-CM

## 2025-04-11 PROCEDURE — G0382 LEV 3 HOSP TYPE B ED VISIT: HCPCS | Performed by: PHYSICIAN ASSISTANT

## 2025-04-11 PROCEDURE — S9083 URGENT CARE CENTER GLOBAL: HCPCS | Performed by: PHYSICIAN ASSISTANT

## 2025-04-11 RX ORDER — AZITHROMYCIN 250 MG/1
TABLET, FILM COATED ORAL
Qty: 6 TABLET | Refills: 0 | Status: SHIPPED | OUTPATIENT
Start: 2025-04-11 | End: 2025-04-15

## 2025-04-11 RX ORDER — ALBUTEROL SULFATE 90 UG/1
2 INHALANT RESPIRATORY (INHALATION) EVERY 6 HOURS PRN
Qty: 8.5 G | Refills: 0 | Status: SHIPPED | OUTPATIENT
Start: 2025-04-11

## 2025-04-11 RX ORDER — BENZONATATE 100 MG/1
100 CAPSULE ORAL 3 TIMES DAILY PRN
Qty: 20 CAPSULE | Refills: 0 | Status: SHIPPED | OUTPATIENT
Start: 2025-04-11

## 2025-04-11 NOTE — PATIENT INSTRUCTIONS
Otitis media left  Zithromax as directed  Upper respiratory infection  Tessalon Perles as needed for cough  Humidifier in bedroom, steam from shower  Follow up with PCP in 3-5 days.  Proceed to  ER if symptoms worsen.

## 2025-04-11 NOTE — PROGRESS NOTES
[facility]        NAME: Frandy Fox is a 49 y.o. female  : 1975    MRN: [id number]  DATE: 2025  TIME: 8:29 AM    Assessment and Plan   Left otitis media, unspecified otitis media type [H66.92]  1. Left otitis media, unspecified otitis media type  azithromycin (ZITHROMAX) 250 mg tablet      2. Acute URI  benzonatate (TESSALON PERLES) 100 mg capsule            Patient Instructions     Otitis media left  Zithromax as directed  Upper respiratory infection  Tessalon Perles as needed for cough  Humidifier in bedroom, steam from shower  Follow up with PCP in 3-5 days.  Proceed to  ER if symptoms worsen.    Chief Complaint     Chief Complaint   Patient presents with    Cold Like Symptoms     Started 5 days ago with non-productive cough, fever, both ears painful left is worse, congestion.  Has been taking tylenol for fever.         History of Present Illness       49-year-old female who presents complaining of cough, congestion, runny nose, postnasal drip x 6 days and having developed left ear pain over the last 48 hours.  Patient states that the symptoms were associated with subjective fevers.  Denies chest pain, shortness of breath.        Review of Systems   Review of Systems   Constitutional:  Positive for fever. Negative for activity change, appetite change, chills, diaphoresis and fatigue.   HENT:  Positive for congestion, ear pain and rhinorrhea. Negative for ear discharge, facial swelling, hearing loss, mouth sores, nosebleeds, postnasal drip, sinus pressure, sinus pain, sneezing, sore throat and voice change.    Respiratory:  Positive for cough. Negative for apnea, choking, chest tightness, shortness of breath, wheezing and stridor.    Cardiovascular: Negative.          Current Medications       Current Outpatient Medications:     azithromycin (ZITHROMAX) 250 mg tablet, Take 2 tablets today then 1 tablet daily x 4 days, Disp: 6 tablet, Rfl: 0    benzonatate (TESSALON PERLES) 100 mg  "capsule, Take 1 capsule (100 mg total) by mouth 3 (three) times a day as needed for cough, Disp: 20 capsule, Rfl: 0    hydroxychloroquine (PLAQUENIL) 200 mg tablet, Take 2 tablets (400 mg total) by mouth daily with breakfast, Disp: 180 tablet, Rfl: 1    nicotine (NICODERM CQ) 21 mg/24 hr TD 24 hr patch, Place 1 patch on the skin every 24 hours, Disp: 28 patch, Rfl: 2    Current Allergies     Allergies as of 04/11/2025 - Reviewed 04/11/2025   Allergen Reaction Noted    Penicillins  03/06/2014            The following portions of the patient's history were reviewed and updated as appropriate: allergies, current medications, past family history, past medical history, past social history, past surgical history and problem list.     Past Medical History:   Diagnosis Date    Disease of thyroid gland     Rheumatoid arthritis involving multiple sites (HCC) 11/30/2020       Past Surgical History:   Procedure Laterality Date    HYSTERECTOMY      TOTAL ABDOMINAL HYSTERECTOMY W/ BILATERAL SALPINGOOPHORECTOMY      TUBAL LIGATION         Family History   Problem Relation Age of Onset    Ovarian cancer Mother     No Known Problems Brother     Pancreatic cancer Maternal Grandfather     Thyroid cancer Maternal Aunt     Liver cancer Maternal Uncle     Pancreatic cancer Maternal Uncle     No Known Problems Daughter     No Known Problems Maternal Grandmother     No Known Problems Paternal Grandmother     No Known Problems Paternal Grandfather     No Known Problems Son     No Known Problems Daughter     No Known Problems Maternal Uncle          Medications have been verified.        Objective   /64   Pulse 94   Temp 97.7 °F (36.5 °C)   Resp 18   Ht 5' 7\" (1.702 m)   Wt 87.5 kg (193 lb)   SpO2 97%   BMI 30.23 kg/m²        Physical Exam     Physical Exam  Constitutional:       General: She is not in acute distress.     Appearance: Normal appearance. She is well-developed. She is not diaphoretic.   HENT:      Head: " Normocephalic and atraumatic.      Right Ear: Hearing, tympanic membrane, ear canal and external ear normal.      Left Ear: Hearing, ear canal and external ear normal. Tympanic membrane is erythematous and bulging.      Nose: Rhinorrhea present.      Mouth/Throat:      Pharynx: Uvula midline.   Cardiovascular:      Rate and Rhythm: Normal rate and regular rhythm.      Heart sounds: Normal heart sounds.   Pulmonary:      Effort: Pulmonary effort is normal. No respiratory distress.      Breath sounds: Normal breath sounds. No stridor. No wheezing, rhonchi or rales.   Chest:      Chest wall: No tenderness.   Musculoskeletal:      Cervical back: Normal range of motion and neck supple.   Lymphadenopathy:      Cervical: Cervical adenopathy present.   Neurological:      Mental Status: She is alert.

## 2025-04-11 NOTE — LETTER
April 11, 2025     Patient: Frandy Fox   YOB: 1975   Date of Visit: 4/11/2025       To Whom it May Concern:    Frandy Fox was seen in my clinic on 4/11/2025. She may return to work on 4/13/2025 .    If you have any questions or concerns, please don't hesitate to call.         Sincerely,          Vipin Huston PA-C        CC: No Recipients

## 2025-05-08 DIAGNOSIS — J06.9 ACUTE URI: ICD-10-CM

## 2025-05-11 RX ORDER — ALBUTEROL SULFATE 90 UG/1
INHALANT RESPIRATORY (INHALATION)
OUTPATIENT
Start: 2025-05-11

## 2025-06-05 DIAGNOSIS — M05.9 SEROPOSITIVE RHEUMATOID ARTHRITIS (HCC): ICD-10-CM

## 2025-06-05 RX ORDER — HYDROXYCHLOROQUINE SULFATE 200 MG/1
400 TABLET, FILM COATED ORAL
Qty: 180 TABLET | Refills: 1 | Status: SHIPPED | OUTPATIENT
Start: 2025-06-05 | End: 2025-12-02